# Patient Record
Sex: FEMALE | Race: WHITE | Employment: FULL TIME | URBAN - METROPOLITAN AREA
[De-identification: names, ages, dates, MRNs, and addresses within clinical notes are randomized per-mention and may not be internally consistent; named-entity substitution may affect disease eponyms.]

---

## 2017-01-21 ENCOUNTER — HOSPITAL ENCOUNTER (OUTPATIENT)
Age: 32
Discharge: HOME | End: 2017-01-21
Attending: FAMILY MEDICINE
Payer: COMMERCIAL

## 2017-01-21 VITALS
SYSTOLIC BLOOD PRESSURE: 116 MMHG | HEART RATE: 88 BPM | BODY MASS INDEX: 44.73 KG/M2 | OXYGEN SATURATION: 96 % | WEIGHT: 285 LBS | RESPIRATION RATE: 16 BRPM | TEMPERATURE: 99 F | DIASTOLIC BLOOD PRESSURE: 79 MMHG | HEIGHT: 67 IN

## 2017-01-21 DIAGNOSIS — J45.901 ASTHMA EXACERBATION, MILD: ICD-10-CM

## 2017-01-21 DIAGNOSIS — J20.9 ACUTE BRONCHITIS, UNSPECIFIED ORGANISM: Primary | ICD-10-CM

## 2017-01-21 PROCEDURE — 99213 OFFICE O/P EST LOW 20 MIN: CPT

## 2017-01-21 PROCEDURE — 99214 OFFICE O/P EST MOD 30 MIN: CPT

## 2017-01-21 RX ORDER — ALBUTEROL SULFATE 90 UG/1
2 AEROSOL, METERED RESPIRATORY (INHALATION) EVERY 4 HOURS PRN
Qty: 1 INHALER | Refills: 6 | Status: SHIPPED | OUTPATIENT
Start: 2017-01-21 | End: 2017-01-31

## 2017-01-21 RX ORDER — PREDNISONE 20 MG/1
40 TABLET ORAL DAILY
Qty: 10 TABLET | Refills: 0 | Status: SHIPPED | OUTPATIENT
Start: 2017-01-21 | End: 2017-01-26

## 2017-01-21 RX ORDER — CEFUROXIME AXETIL 500 MG/1
500 TABLET ORAL 2 TIMES DAILY
Qty: 14 TABLET | Refills: 0 | Status: SHIPPED | OUTPATIENT
Start: 2017-01-21 | End: 2017-01-28

## 2017-01-21 NOTE — ED PROVIDER NOTES
Patient presents with:  Cough/URI      HPI:   Pop Reynaga is a 36year old female who presents with complaints of chest congestion, productive cough with colored sputum for  3  weeks. Pt was seen here 3 weeks ago. Was running a fever then.  Was dx with lung   • Diabetes Paternal Grandmother    • Heart Attack Father         Smoking Status: Never Smoker                      Alcohol Use: No                  Nursing note reviewed and I agree with documentation.     The patient's Medication List, Past Medical Axetil (CEFTIN) 500 MG Oral Tab 14 tablet 0      Sig: Take 1 tablet (500 mg total) by mouth 2 (two) times daily.       Albuterol Sulfate HFA (PROAIR HFA) 108 (90 BASE) MCG/ACT Inhalation Aero Soln 1 Inhaler 6      Sig: Inhale 2 puffs into the lungs every 4 PATIENT STATED HISTORY:  Wet/dry cough, shortness of breath and fever for two days. FINDINGS:  Cardiac size and pulmonary vasculature are within normal limits. No pleural effusions. No pneumothorax. Hyperexpansion of the lungs.        12/31/2016  CONCLUS

## 2017-03-10 ENCOUNTER — HOSPITAL ENCOUNTER (OUTPATIENT)
Age: 32
Discharge: HOME | End: 2017-03-10
Attending: FAMILY MEDICINE
Payer: COMMERCIAL

## 2017-03-10 VITALS
DIASTOLIC BLOOD PRESSURE: 84 MMHG | HEART RATE: 76 BPM | WEIGHT: 290 LBS | RESPIRATION RATE: 16 BRPM | HEIGHT: 67 IN | OXYGEN SATURATION: 99 % | BODY MASS INDEX: 45.52 KG/M2 | TEMPERATURE: 98 F | SYSTOLIC BLOOD PRESSURE: 122 MMHG

## 2017-03-10 DIAGNOSIS — N89.8 VAGINAL DISCHARGE: Primary | ICD-10-CM

## 2017-03-10 LAB
POCT BILIRUBIN URINE: NEGATIVE
POCT GLUCOSE URINE: NEGATIVE MG/DL
POCT KETONE URINE: NEGATIVE MG/DL
POCT LEUKOCYTE ESTERASE URINE: NEGATIVE
POCT NITRITE URINE: NEGATIVE
POCT PH URINE: 7 (ref 5–8)
POCT PROTEIN URINE: NEGATIVE MG/DL
POCT SPECIFIC GRAVITY URINE: 1.01
POCT URINE CLARITY: CLEAR
POCT URINE COLOR: YELLOW
POCT UROBILINOGEN URINE: 0.2 MG/DL

## 2017-03-10 PROCEDURE — 99214 OFFICE O/P EST MOD 30 MIN: CPT

## 2017-03-10 PROCEDURE — 81002 URINALYSIS NONAUTO W/O SCOPE: CPT | Performed by: FAMILY MEDICINE

## 2017-03-10 NOTE — ED INITIAL ASSESSMENT (HPI)
Pt sts 2 weeks ago noted liquid stool passage via vagina when wiping. Evaluated by OB/GYNE with manual exam on 2/27 and CT on 3/6. CT with normal results.  Sts this has been happening intermittently since October 2016 along with passing of gas through vagin

## 2017-03-11 NOTE — ED PROVIDER NOTES
[place] [address] [facility] [place]   Patient presents with:  Bin-G (gynecologic)    Stated Complaint: stool issue    HPI  79-year-old female coming in with complains of a \"stool issue\", feels a sense of passing liquid stool vi Maternal Aunt      liver   • Cancer Maternal Grandmother      lung   • Diabetes Paternal Grandmother    • Heart Attack Father          Smoking Status: Never Smoker                      Alcohol Use: No                Review of Systems  Positive for stated c visualization of the posterior wall noted with no leakage noted.        ED Course     Labs Reviewed   POCT URINALYSIS DIPSTICK - Abnormal; Notable for the following:     Blood, Urine Trace-Intact (*)     All other components within normal limits       Order

## 2017-03-21 ENCOUNTER — OFFICE VISIT (OUTPATIENT)
Dept: UROLOGY | Facility: HOSPITAL | Age: 32
End: 2017-03-21
Attending: OBSTETRICS & GYNECOLOGY
Payer: COMMERCIAL

## 2017-03-21 VITALS
RESPIRATION RATE: 16 BRPM | WEIGHT: 200 LBS | DIASTOLIC BLOOD PRESSURE: 84 MMHG | SYSTOLIC BLOOD PRESSURE: 124 MMHG | BODY MASS INDEX: 31 KG/M2

## 2017-03-21 DIAGNOSIS — N82.3 RECTOVAGINAL FISTULA: Primary | ICD-10-CM

## 2017-03-21 DIAGNOSIS — N81.84 PELVIC MUSCLE WASTING: ICD-10-CM

## 2017-03-21 DIAGNOSIS — N39.3 FEMALE STRESS INCONTINENCE: ICD-10-CM

## 2017-03-21 PROCEDURE — 99201 HC OUTPT EVAL AND MGNT NEW PT LEVEL 1: CPT

## 2017-03-21 NOTE — PROGRESS NOTES
Maryann Burton,   3/21/2017     Referred by Dr. Mor Finney    Patient presents with:   Other:  pt c/o leaking donna thru the vagina, vag delivery     Uncomplicated pregnancy  -  2' tear (epidural pushed 2 hours) with uncomplicated recovery  s limited by UI/POP?: Yes    Review of Systems:    A comprehensive 12 point review of systems was completed. Pertinent positives noted in the the HPI.   No CP  No SOB  No s/sx of UTI    GENERAL EXAM:  GENERAL:  Alert and Oriented, and NAD  HEENT:  Normal, no risks related to RVFisula repair including infection, recurrence, pain, dyspareunia, fecal incontinence      Medications Discussed:  bowel mgmt with fiber, miralax, and immodium prn    Treatment Plan, Surgical: discussed  Rectal Vaginal/Vesico Vaginal fist

## 2017-03-21 NOTE — PATIENT INSTRUCTIONS
Please call with questions or concerns, [phone]    If interested in surgery you can call Susi to schedule, [address] [facility] MEDICINE     Post-Operative Guidelines      · AVOID CONSTIPATION - Take Miralax: one capful i increase the amount---fiber is not addictive and will not cause diarrhea.      _____Milk of Magnesia    Begin with 1 teaspoon every evening. This is a very low dose---approximately one-sixth of the typical dose.   You may need to increase the amount depend

## 2017-04-10 ENCOUNTER — TELEPHONE (OUTPATIENT)
Dept: UROLOGY | Facility: HOSPITAL | Age: 32
End: 2017-04-10

## 2017-04-10 NOTE — TELEPHONE ENCOUNTER
Pt called with questions regarding recto-vaginal fistula repair that Dr. Pablo Holcomb discussed with pt at last office visit 3-21-17. Pt wants to know more about the procedure and what it entails.   Will forward to Dr. Pablo Holcomb, [facility] for pt to notify her that we recd her mes

## 2017-04-21 NOTE — TELEPHONE ENCOUNTER
TC to pt re: RVF  Answered questions  She's interested in surgical management  Finishing up breastfeeding  Discussed plan  Surgery scheduled 6/1/17

## 2017-05-09 ENCOUNTER — APPOINTMENT (OUTPATIENT)
Dept: LAB | Age: 32
End: 2017-05-09
Attending: OBSTETRICS & GYNECOLOGY
Payer: COMMERCIAL

## 2017-05-09 ENCOUNTER — TELEPHONE (OUTPATIENT)
Dept: UROLOGY | Facility: HOSPITAL | Age: 32
End: 2017-05-09

## 2017-05-09 DIAGNOSIS — Z86.14 HISTORY OF MRSA INFECTION: Primary | ICD-10-CM

## 2017-05-09 DIAGNOSIS — Z01.818 PRE-OP TESTING: ICD-10-CM

## 2017-05-09 DIAGNOSIS — Z86.14 HISTORY OF MRSA INFECTION: ICD-10-CM

## 2017-05-09 PROCEDURE — 87081 CULTURE SCREEN ONLY: CPT

## 2017-05-09 NOTE — TELEPHONE ENCOUNTER
Pt phoned per Dr Seth Garcia request. LM that Surgery is scheduled for 6/2/17. We received a fax from our lab/infection control saying she has a hx of MRSA from 2003 in her right thigh. Pt instructed she will need to be cleared w/ 3 cultures.  Orders placed f

## 2017-05-10 ENCOUNTER — TELEPHONE (OUTPATIENT)
Dept: UROLOGY | Facility: HOSPITAL | Age: 32
End: 2017-05-10

## 2017-05-10 ENCOUNTER — LAB ENCOUNTER (OUTPATIENT)
Dept: LAB | Age: 32
End: 2017-05-10
Attending: OBSTETRICS & GYNECOLOGY
Payer: COMMERCIAL

## 2017-05-10 DIAGNOSIS — Z01.818 PRE-OP TESTING: ICD-10-CM

## 2017-05-10 DIAGNOSIS — Z86.14 HISTORY OF MRSA INFECTION: Primary | ICD-10-CM

## 2017-05-10 PROCEDURE — 87081 CULTURE SCREEN ONLY: CPT

## 2017-05-10 NOTE — TELEPHONE ENCOUNTER
Lab called [facility]  bc pt is present in their office for 2nd MRSA cx and no orders in system. Called lab back, explained that we are waiting on 1st cx results to determine if further testing is needed. Lab placed MRSA cx order for today and test was complete.

## 2017-05-12 ENCOUNTER — APPOINTMENT (OUTPATIENT)
Dept: LAB | Age: 32
End: 2017-05-12
Attending: OBSTETRICS & GYNECOLOGY
Payer: COMMERCIAL

## 2017-05-12 ENCOUNTER — TELEPHONE (OUTPATIENT)
Dept: UROLOGY | Facility: HOSPITAL | Age: 32
End: 2017-05-12

## 2017-05-12 DIAGNOSIS — Z86.14 HX MRSA INFECTION: Primary | ICD-10-CM

## 2017-05-12 DIAGNOSIS — Z86.14 HX MRSA INFECTION: ICD-10-CM

## 2017-05-12 PROCEDURE — 87081 CULTURE SCREEN ONLY: CPT

## 2017-05-12 NOTE — TELEPHONE ENCOUNTER
Phoned and LM informing pt of neg MRSA culture. Instructed pt to have 3rd culture done. Order placed.

## 2017-05-16 ENCOUNTER — TELEPHONE (OUTPATIENT)
Dept: UROLOGY | Facility: HOSPITAL | Age: 32
End: 2017-05-16

## 2017-05-16 NOTE — TELEPHONE ENCOUNTER
Called pt and notified of 3rd negative MRSA cx, Dr. Heladio Dow aware, surgery scheduled for 6-1-17, will continue as planned, pt verbalizes understanding

## 2017-05-19 ENCOUNTER — TELEPHONE (OUTPATIENT)
Dept: UROLOGY | Facility: HOSPITAL | Age: 32
End: 2017-05-19

## 2017-05-25 NOTE — OR NURSING
Patient removed from Isolation per Breezy Sifuentes in infection control after 3 negative swabs obtained 5/9/17, 5/10/17 and 5/12/17.

## 2017-06-01 ENCOUNTER — SURGERY (OUTPATIENT)
Age: 32
End: 2017-06-01

## 2017-06-01 ENCOUNTER — HOSPITAL ENCOUNTER (OUTPATIENT)
Facility: HOSPITAL | Age: 32
Setting detail: HOSPITAL OUTPATIENT SURGERY
Discharge: HOME | End: 2017-06-01
Attending: OBSTETRICS & GYNECOLOGY | Admitting: OBSTETRICS & GYNECOLOGY
Payer: COMMERCIAL

## 2017-06-01 ENCOUNTER — ANESTHESIA (OUTPATIENT)
Dept: SURGERY | Facility: HOSPITAL | Age: 32
End: 2017-06-01
Payer: COMMERCIAL

## 2017-06-01 ENCOUNTER — ANESTHESIA EVENT (OUTPATIENT)
Dept: SURGERY | Facility: HOSPITAL | Age: 32
End: 2017-06-01
Payer: COMMERCIAL

## 2017-06-01 VITALS
TEMPERATURE: 98 F | OXYGEN SATURATION: 100 % | DIASTOLIC BLOOD PRESSURE: 86 MMHG | RESPIRATION RATE: 20 BRPM | WEIGHT: 293 LBS | BODY MASS INDEX: 45.99 KG/M2 | SYSTOLIC BLOOD PRESSURE: 148 MMHG | HEART RATE: 80 BPM | HEIGHT: 67 IN

## 2017-06-01 PROCEDURE — 88304 TISSUE EXAM BY PATHOLOGIST: CPT | Performed by: OBSTETRICS & GYNECOLOGY

## 2017-06-01 PROCEDURE — 81025 URINE PREGNANCY TEST: CPT | Performed by: OBSTETRICS & GYNECOLOGY

## 2017-06-01 PROCEDURE — 0UQG0ZZ REPAIR VAGINA, OPEN APPROACH: ICD-10-PCS | Performed by: OBSTETRICS & GYNECOLOGY

## 2017-06-01 RX ORDER — BUPIVACAINE HYDROCHLORIDE AND EPINEPHRINE 2.5; 5 MG/ML; UG/ML
INJECTION, SOLUTION EPIDURAL; INFILTRATION; INTRACAUDAL; PERINEURAL AS NEEDED
Status: DISCONTINUED | OUTPATIENT
Start: 2017-06-01 | End: 2017-06-01

## 2017-06-01 RX ORDER — ONDANSETRON 2 MG/ML
4 INJECTION INTRAMUSCULAR; INTRAVENOUS AS NEEDED
Status: DISCONTINUED | OUTPATIENT
Start: 2017-06-01 | End: 2017-06-01

## 2017-06-01 RX ORDER — NALOXONE HYDROCHLORIDE 0.4 MG/ML
80 INJECTION, SOLUTION INTRAMUSCULAR; INTRAVENOUS; SUBCUTANEOUS AS NEEDED
Status: DISCONTINUED | OUTPATIENT
Start: 2017-06-01 | End: 2017-06-01

## 2017-06-01 RX ORDER — HYDROCODONE BITARTRATE AND ACETAMINOPHEN 5; 325 MG/1; MG/1
2 TABLET ORAL AS NEEDED
Status: COMPLETED | OUTPATIENT
Start: 2017-06-01 | End: 2017-06-01

## 2017-06-01 RX ORDER — MEPERIDINE HYDROCHLORIDE 25 MG/ML
12.5 INJECTION INTRAMUSCULAR; INTRAVENOUS; SUBCUTANEOUS AS NEEDED
Status: DISCONTINUED | OUTPATIENT
Start: 2017-06-01 | End: 2017-06-01

## 2017-06-01 RX ORDER — SODIUM CHLORIDE, SODIUM LACTATE, POTASSIUM CHLORIDE, CALCIUM CHLORIDE 600; 310; 30; 20 MG/100ML; MG/100ML; MG/100ML; MG/100ML
INJECTION, SOLUTION INTRAVENOUS CONTINUOUS
Status: DISCONTINUED | OUTPATIENT
Start: 2017-06-01 | End: 2017-06-01

## 2017-06-01 RX ORDER — HYDROMORPHONE HYDROCHLORIDE 1 MG/ML
INJECTION, SOLUTION INTRAMUSCULAR; INTRAVENOUS; SUBCUTANEOUS
Status: DISCONTINUED
Start: 2017-06-01 | End: 2017-06-01 | Stop reason: WASHOUT

## 2017-06-01 RX ORDER — METOCLOPRAMIDE HYDROCHLORIDE 5 MG/ML
INJECTION INTRAMUSCULAR; INTRAVENOUS
Status: COMPLETED
Start: 2017-06-01 | End: 2017-06-01

## 2017-06-01 RX ORDER — ONDANSETRON 2 MG/ML
INJECTION INTRAMUSCULAR; INTRAVENOUS
Status: COMPLETED
Start: 2017-06-01 | End: 2017-06-01

## 2017-06-01 RX ORDER — HYDROCODONE BITARTRATE AND ACETAMINOPHEN 5; 325 MG/1; MG/1
1 TABLET ORAL AS NEEDED
Status: COMPLETED | OUTPATIENT
Start: 2017-06-01 | End: 2017-06-01

## 2017-06-01 RX ORDER — METOCLOPRAMIDE HYDROCHLORIDE 5 MG/ML
10 INJECTION INTRAMUSCULAR; INTRAVENOUS AS NEEDED
Status: DISCONTINUED | OUTPATIENT
Start: 2017-06-01 | End: 2017-06-01

## 2017-06-01 RX ORDER — HYDROMORPHONE HYDROCHLORIDE 1 MG/ML
0.4 INJECTION, SOLUTION INTRAMUSCULAR; INTRAVENOUS; SUBCUTANEOUS EVERY 5 MIN PRN
Status: DISCONTINUED | OUTPATIENT
Start: 2017-06-01 | End: 2017-06-01

## 2017-06-01 NOTE — ANESTHESIA PREPROCEDURE EVALUATION
PRE-OP EVALUATION    Patient Name: Prosper Wen    Pre-op Diagnosis: RECTOVAGINAL FISTULA    Procedure(s):  RECTOVAGINAL FISTULA REPAIR, CYSTOSCOPY    Surgeon(s) and Role:     Marcelina Narvaez, DO - Primary     * Yue Del Valle MD    Pre-op vitals r discussed with surgeon and patient. Plan/risks discussed with: patient and spouse  Use of blood product(s) discussed with: patient and spouse    Consented to blood products.           Present on Admission:   **None**

## 2017-06-01 NOTE — ANESTHESIA POSTPROCEDURE EVALUATION
[facility]    Alondra Venegas Patient Status:  Hospital Outpatient Surgery   Age/Gender 36year old female MRN [id number]   Location [address] [facility] Attending Oren Howell DO   Hosp Day # 0 PCP Annmarie

## 2017-06-01 NOTE — H&P
35 y/o female with rectovaginal fistula for surgical management. She reports gas & stool per rectum since 4/16 vaginal delivery.    Past Medical History   Diagnosis Date   • Asthma    • Abnormal Pap smear of cervix      during college, 2010? follow up nl

## 2017-06-01 NOTE — OPERATIVE REPORT
Pre Op: rectovaginal fistula  Post op: same    Procedure: Rectovaginal fistula repair  Specimen: fistula tract  Surgeon: Ki Penn DO  Assist: KATHERINE Penny    EBL: 25cc   UOP [id number]   No complications    Findings: 2cm rectal defect.  Hemostasis upon completion fistula. Urine was drained from the bladder. The patient was then removed from the dorsal lithotomy position, awakened and extubated and transferred from the operating room to the recovery room in stable condition, having tolerated the procedure well.

## 2017-06-02 ENCOUNTER — TELEPHONE (OUTPATIENT)
Dept: UROLOGY | Facility: HOSPITAL | Age: 32
End: 2017-06-02

## 2017-06-02 RX ORDER — POLYETHYLENE GLYCOL 3350 17 G/17G
17 POWDER, FOR SOLUTION ORAL DAILY
COMMUNITY
End: 2018-12-03 | Stop reason: ALTCHOICE

## 2017-06-02 RX ORDER — IBUPROFEN 600 MG/1
600 TABLET ORAL EVERY 6 HOURS PRN
COMMUNITY
End: 2018-12-03 | Stop reason: ALTCHOICE

## 2017-06-02 NOTE — TELEPHONE ENCOUNTER
Returning [place] call - voiding well, had small BM prior to discharge yesterday, is not using Norco, states is made her nauseated - is not having any pain; has not started the Ibuprofen - Only concern is that she has a pressure sensation in rectal ar

## 2017-06-08 ENCOUNTER — TELEPHONE (OUTPATIENT)
Dept: UROLOGY | Facility: HOSPITAL | Age: 32
End: 2017-06-08

## 2017-06-20 ENCOUNTER — OFFICE VISIT (OUTPATIENT)
Dept: UROLOGY | Facility: HOSPITAL | Age: 32
End: 2017-06-20
Attending: OBSTETRICS & GYNECOLOGY
Payer: COMMERCIAL

## 2017-06-20 VITALS
BODY MASS INDEX: 45.99 KG/M2 | WEIGHT: 293 LBS | DIASTOLIC BLOOD PRESSURE: 84 MMHG | SYSTOLIC BLOOD PRESSURE: 120 MMHG | HEIGHT: 67 IN

## 2017-06-20 DIAGNOSIS — Z98.890 POST-OPERATIVE STATE: Primary | ICD-10-CM

## 2017-06-20 PROCEDURE — 99211 OFF/OP EST MAY X REQ PHY/QHP: CPT

## 2017-06-20 NOTE — PROGRESS NOTES
She is s/p Post-Op Summary  Procedure Date: 06/01/17  Procedure Name:  (rectovaginal fistula repair)  Do you feel your surgery was successful?: Very successful  Compared to before surgery are you?: Much better  If you could go back to before your surgery,

## 2017-07-18 ENCOUNTER — OFFICE VISIT (OUTPATIENT)
Dept: UROLOGY | Facility: HOSPITAL | Age: 32
End: 2017-07-18
Attending: OBSTETRICS & GYNECOLOGY
Payer: COMMERCIAL

## 2017-07-18 VITALS
WEIGHT: 293 LBS | HEIGHT: 67 IN | BODY MASS INDEX: 45.99 KG/M2 | SYSTOLIC BLOOD PRESSURE: 136 MMHG | DIASTOLIC BLOOD PRESSURE: 82 MMHG

## 2017-07-18 DIAGNOSIS — Z98.890 POST-OPERATIVE STATE: Primary | ICD-10-CM

## 2017-07-18 DIAGNOSIS — N81.84 PELVIC MUSCLE WASTING: ICD-10-CM

## 2017-07-18 PROCEDURE — 99211 OFF/OP EST MAY X REQ PHY/QHP: CPT

## 2017-07-18 NOTE — PROGRESS NOTES
She is s/p Post-Op Summary  Procedure Date: 06/01/17  Procedure Name: Other (Please specify in comments) (rectovaginal fistula)  Post-Op Symptoms: Patient denies pain, CASSANDRA, UUI, prolapse symptoms, nausea/vomitting, fevers/chills, bleeding, voiding dysfunct

## 2017-09-19 ENCOUNTER — OFFICE VISIT (OUTPATIENT)
Dept: UROLOGY | Facility: HOSPITAL | Age: 32
End: 2017-09-19
Attending: OBSTETRICS & GYNECOLOGY
Payer: COMMERCIAL

## 2017-09-19 VITALS
WEIGHT: 293 LBS | DIASTOLIC BLOOD PRESSURE: 72 MMHG | HEIGHT: 67 IN | BODY MASS INDEX: 45.99 KG/M2 | SYSTOLIC BLOOD PRESSURE: 126 MMHG

## 2017-09-19 DIAGNOSIS — Z98.890 POST-OPERATIVE STATE: Primary | ICD-10-CM

## 2017-09-19 DIAGNOSIS — N81.84 PELVIC MUSCLE WASTING: ICD-10-CM

## 2017-09-19 PROCEDURE — 99211 OFF/OP EST MAY X REQ PHY/QHP: CPT

## 2017-09-19 NOTE — PROGRESS NOTES
She is s/p Post-Op Summary  Procedure Date: 06/01/17  Procedure Name:  (fistula repair)  Post-Op Symptoms: Other (please specify); Patient denies pain, CASSANDRA, UUI, prolapse symptoms, nausea/vomitting, fevers/chills, bleeding, voiding dysfunction, and defecato

## 2018-01-05 ENCOUNTER — HOSPITAL ENCOUNTER (OUTPATIENT)
Age: 33
Discharge: HOME | End: 2018-01-05
Attending: EMERGENCY MEDICINE
Payer: COMMERCIAL

## 2018-01-05 VITALS
BODY MASS INDEX: 45.52 KG/M2 | TEMPERATURE: 97 F | HEIGHT: 67 IN | DIASTOLIC BLOOD PRESSURE: 80 MMHG | HEART RATE: 90 BPM | RESPIRATION RATE: 16 BRPM | OXYGEN SATURATION: 99 % | WEIGHT: 290 LBS | SYSTOLIC BLOOD PRESSURE: 104 MMHG

## 2018-01-05 DIAGNOSIS — J02.0 STREPTOCOCCAL SORE THROAT: Primary | ICD-10-CM

## 2018-01-05 PROCEDURE — 87430 STREP A AG IA: CPT | Performed by: EMERGENCY MEDICINE

## 2018-01-05 PROCEDURE — 99213 OFFICE O/P EST LOW 20 MIN: CPT

## 2018-01-05 PROCEDURE — 99214 OFFICE O/P EST MOD 30 MIN: CPT

## 2018-01-05 RX ORDER — AMOXICILLIN 875 MG/1
875 TABLET, COATED ORAL 2 TIMES DAILY
Qty: 20 TABLET | Refills: 0 | Status: SHIPPED | OUTPATIENT
Start: 2018-01-05 | End: 2018-01-15

## 2018-01-05 NOTE — ED PROVIDER NOTES
Patient Seen in: [address] [facility]    History   Patient presents with:  Sore Throat    Stated Complaint: sore throat    HPI    Patient is a very pleasant 72-year-old female presents with a sore throat. Pain on swallowing. Mild congestion. No murmurs. Regular rate and rhythm. Abdomen: Soft and nontender throughout. No rebound or guarding  Extremities: No clubbing/cyanosis/edema. Skin: No rashes, no pallor  Neuro: Awake oriented ×3.   Nonfocal.  Good strength throughout    ED Course   Labs

## 2018-01-05 NOTE — ED INITIAL ASSESSMENT (HPI)
Pt with c/o sore throat that started last night around 6pm.  Pt denies fevers. Pt c/o cold sx.   Last motrin around 11pm.   home with flu last week

## 2018-02-26 ENCOUNTER — HOSPITAL ENCOUNTER (OUTPATIENT)
Age: 33
Discharge: HOME | End: 2018-02-26
Attending: FAMILY MEDICINE
Payer: COMMERCIAL

## 2018-02-26 VITALS
SYSTOLIC BLOOD PRESSURE: 120 MMHG | HEART RATE: 82 BPM | OXYGEN SATURATION: 99 % | TEMPERATURE: 98 F | RESPIRATION RATE: 18 BRPM | DIASTOLIC BLOOD PRESSURE: 90 MMHG

## 2018-02-26 DIAGNOSIS — H65.02 ACUTE SEROUS OTITIS MEDIA OF LEFT EAR, RECURRENCE NOT SPECIFIED: ICD-10-CM

## 2018-02-26 DIAGNOSIS — J01.00 ACUTE MAXILLARY SINUSITIS, RECURRENCE NOT SPECIFIED: Primary | ICD-10-CM

## 2018-02-26 PROBLEM — N82.3 RECTO-VAGINAL FISTULA: Status: ACTIVE | Noted: 2018-02-26

## 2018-02-26 PROCEDURE — 99214 OFFICE O/P EST MOD 30 MIN: CPT

## 2018-02-26 PROCEDURE — 87624 HPV HI-RISK TYP POOLED RSLT: CPT | Performed by: OBSTETRICS & GYNECOLOGY

## 2018-02-26 PROCEDURE — 88175 CYTOPATH C/V AUTO FLUID REDO: CPT | Performed by: OBSTETRICS & GYNECOLOGY

## 2018-02-26 PROCEDURE — 99213 OFFICE O/P EST LOW 20 MIN: CPT

## 2018-02-26 RX ORDER — PREDNISONE 20 MG/1
40 TABLET ORAL DAILY
Qty: 10 TABLET | Refills: 0 | Status: SHIPPED | OUTPATIENT
Start: 2018-02-26 | End: 2018-03-03

## 2018-02-26 RX ORDER — AMOXICILLIN AND CLAVULANATE POTASSIUM 875; 125 MG/1; MG/1
1 TABLET, FILM COATED ORAL 2 TIMES DAILY
Qty: 20 TABLET | Refills: 0 | Status: SHIPPED | OUTPATIENT
Start: 2018-02-26 | End: 2018-03-08

## 2018-02-26 NOTE — ED PROVIDER NOTES
Patient Seen in: [address] [facility]    History   Patient presents with:  Sinusitis  Ear Pain    Stated Complaint: sinus congestion    HPI  61-year-old female presents to the immediate care today with 10 day history of sinus pressure, con external ear canal right ear and abnormal TM left ear - dull, bulging and serous middle ear fluid  Nose: moderate congestion, moderate maxillary sinus tenderness bilateral, moderate frontal sinus tenderness bilateral. No nasal flaring  Throat: lips, mucosa

## 2018-12-03 PROCEDURE — 86850 RBC ANTIBODY SCREEN: CPT | Performed by: OBSTETRICS & GYNECOLOGY

## 2018-12-03 PROCEDURE — 87389 HIV-1 AG W/HIV-1&-2 AB AG IA: CPT | Performed by: OBSTETRICS & GYNECOLOGY

## 2018-12-03 PROCEDURE — 86762 RUBELLA ANTIBODY: CPT | Performed by: OBSTETRICS & GYNECOLOGY

## 2018-12-03 PROCEDURE — 86780 TREPONEMA PALLIDUM: CPT | Performed by: OBSTETRICS & GYNECOLOGY

## 2018-12-03 PROCEDURE — 86901 BLOOD TYPING SEROLOGIC RH(D): CPT | Performed by: OBSTETRICS & GYNECOLOGY

## 2018-12-03 PROCEDURE — 86900 BLOOD TYPING SEROLOGIC ABO: CPT | Performed by: OBSTETRICS & GYNECOLOGY

## 2019-01-02 PROCEDURE — 87086 URINE CULTURE/COLONY COUNT: CPT | Performed by: OBSTETRICS & GYNECOLOGY

## 2019-01-23 PROBLEM — Z86.14 HISTORY OF MRSA INFECTION: Status: ACTIVE | Noted: 2019-01-23

## 2019-01-23 PROBLEM — Z67.91 RH NEGATIVE STATE IN ANTEPARTUM PERIOD: Status: ACTIVE | Noted: 2019-01-23

## 2019-01-23 PROBLEM — Z67.91 RH NEGATIVE STATE IN ANTEPARTUM PERIOD (HCC): Status: ACTIVE | Noted: 2019-01-23

## 2019-01-23 PROBLEM — O26.899 RH NEGATIVE STATE IN ANTEPARTUM PERIOD: Status: ACTIVE | Noted: 2019-01-23

## 2019-01-23 PROBLEM — O99.210 OBESITY AFFECTING PREGNANCY, ANTEPARTUM (HCC): Status: ACTIVE | Noted: 2019-01-23

## 2019-01-23 PROBLEM — Z87.59 HISTORY OF DELIVERY OF MACROSOMAL INFANT: Status: ACTIVE | Noted: 2019-01-23

## 2019-01-23 PROBLEM — O99.210 OBESITY AFFECTING PREGNANCY, ANTEPARTUM: Status: ACTIVE | Noted: 2019-01-23

## 2019-01-23 PROBLEM — O26.899 RH NEGATIVE STATE IN ANTEPARTUM PERIOD (HCC): Status: ACTIVE | Noted: 2019-01-23

## 2019-02-28 PROBLEM — O44.00 PLACENTA PREVIA WITHOUT HEMORRHAGE, ANTEPARTUM: Status: ACTIVE | Noted: 2019-02-28

## 2019-02-28 PROBLEM — O44.00 PLACENTA PREVIA WITHOUT HEMORRHAGE, ANTEPARTUM (HCC): Status: ACTIVE | Noted: 2019-02-28

## 2019-02-28 PROCEDURE — 87086 URINE CULTURE/COLONY COUNT: CPT | Performed by: OBSTETRICS & GYNECOLOGY

## 2019-03-26 ENCOUNTER — OFFICE VISIT (OUTPATIENT)
Dept: PERINATAL CARE | Facility: HOSPITAL | Age: 34
End: 2019-03-26
Attending: OBSTETRICS & GYNECOLOGY
Payer: COMMERCIAL

## 2019-03-26 VITALS
HEIGHT: 67 IN | DIASTOLIC BLOOD PRESSURE: 79 MMHG | WEIGHT: 269 LBS | SYSTOLIC BLOOD PRESSURE: 116 MMHG | BODY MASS INDEX: 42.22 KG/M2 | HEART RATE: 96 BPM

## 2019-03-26 DIAGNOSIS — O99.210 OBESITY AFFECTING PREGNANCY, ANTEPARTUM: ICD-10-CM

## 2019-03-26 DIAGNOSIS — O44.00 PLACENTA PREVIA WITHOUT HEMORRHAGE, ANTEPARTUM: ICD-10-CM

## 2019-03-26 DIAGNOSIS — O35.8XX0 PYELECTASIS OF FETUS ON PRENATAL ULTRASOUND: ICD-10-CM

## 2019-03-26 DIAGNOSIS — Z87.59 HISTORY OF DELIVERY OF MACROSOMAL INFANT: ICD-10-CM

## 2019-03-26 DIAGNOSIS — N82.3 RECTO-VAGINAL FISTULA: ICD-10-CM

## 2019-03-26 DIAGNOSIS — E66.01 MORBID OBESITY WITH BMI OF 40.0-44.9, ADULT (HCC): ICD-10-CM

## 2019-03-26 PROBLEM — O35.EXX0 PYELECTASIS OF FETUS ON PRENATAL ULTRASOUND: Status: ACTIVE | Noted: 2019-03-26

## 2019-03-26 PROBLEM — O35.EXX0 PYELECTASIS OF FETUS ON PRENATAL ULTRASOUND (HCC): Status: ACTIVE | Noted: 2019-03-26

## 2019-03-26 PROCEDURE — 99244 OFF/OP CNSLTJ NEW/EST MOD 40: CPT | Performed by: OBSTETRICS & GYNECOLOGY

## 2019-03-26 PROCEDURE — 76817 TRANSVAGINAL US OBSTETRIC: CPT | Performed by: OBSTETRICS & GYNECOLOGY

## 2019-03-26 PROCEDURE — 76811 OB US DETAILED SNGL FETUS: CPT | Performed by: OBSTETRICS & GYNECOLOGY

## 2019-03-26 NOTE — PROGRESS NOTES
Indication: placenta previa.   ____________________________________________________________________________  History: Age: 34 years.  : 2 Para: 1.  ____________________________________________________________________________  Dating:  LMP: 10/09/20 mild.    ____________________________________________________________________________  Maternal Structures:  Cervix: normal. Cervical length: 5 mm.  ____________________________________________________________________________  Comments:    Jacque Jasso, women, increasing obesity is associated with decreasing spontaneous pregnancy rates. The increased risk of miscarriage in obese women may be because such women often have PCOS or isolated insulin resistance.                  Due to its strong association w infection, thromboembolism, and endometritis. Maternal obesity appears to be associated with a small increase in the absolute rate of some congenital anomalies, and the risk may increase with increasing maternal weight.   The risk of neural tube pyelectasis detected in the mid-trimester may progress; therefore, a follow up ultrasound evaluation in the early third trimester is advised. IMPRESSION:    1.  IUP at  24 0/7 wks    2. Scan consistent with dates     3.   No ultrasound evidence of st

## 2019-04-19 PROCEDURE — 86850 RBC ANTIBODY SCREEN: CPT | Performed by: OBSTETRICS & GYNECOLOGY

## 2019-04-19 PROCEDURE — 87389 HIV-1 AG W/HIV-1&-2 AB AG IA: CPT | Performed by: OBSTETRICS & GYNECOLOGY

## 2019-04-23 NOTE — PROGRESS NOTES
John Medina  Dear Dr. Gerardo Leija,     Thank you for requesting ultrasound evaluation and maternal fetal medicine consultation on your patient Ranjan Herrera.   As you are aware she is a 34year old female  with a Sin Ratio 1.075  47th%  FL/AC Ratio 0.222  n/a  BPD/FL Ratio 1.162  <5th%  HC/FL Ratio 4.795  26th%  EFW (lbs/oz) 3 lbs 6 ozs  EFW (g) 1546 g  55th%     Fetal heart activity: present. Fetal heart rate: 146 bpm.   Fetal presentation: cephalic.    Amniotic fluid: antepartum bleeding decrease as the distance between the placental edge and internal os increases.  There is a general consensus that there is a reasonable possibility of vaginal delivery without hemorrhage when the placenta is more than 20 mm from the inte placental edge is greater than 1 cm from the internal loss, she would be a good candidate for a vaginal delivery.     Thank you for allowing me to participate in the care of your patient.   Please do not hesitate to contact me if additional questions or con

## 2019-05-07 ENCOUNTER — OFFICE VISIT (OUTPATIENT)
Dept: PERINATAL CARE | Facility: HOSPITAL | Age: 34
End: 2019-05-07
Attending: OBSTETRICS & GYNECOLOGY
Payer: COMMERCIAL

## 2019-05-07 VITALS
SYSTOLIC BLOOD PRESSURE: 106 MMHG | DIASTOLIC BLOOD PRESSURE: 76 MMHG | WEIGHT: 274 LBS | BODY MASS INDEX: 43 KG/M2 | HEART RATE: 99 BPM | HEIGHT: 67 IN

## 2019-05-07 DIAGNOSIS — O35.8XX0 PYELECTASIS OF FETUS ON PRENATAL ULTRASOUND: ICD-10-CM

## 2019-05-07 DIAGNOSIS — E66.01 MORBID OBESITY WITH BMI OF 40.0-44.9, ADULT (HCC): ICD-10-CM

## 2019-05-07 DIAGNOSIS — O99.210 OBESITY AFFECTING PREGNANCY, ANTEPARTUM: ICD-10-CM

## 2019-05-07 DIAGNOSIS — N82.3 RECTO-VAGINAL FISTULA: ICD-10-CM

## 2019-05-07 DIAGNOSIS — Z87.59 HISTORY OF DELIVERY OF MACROSOMAL INFANT: ICD-10-CM

## 2019-05-07 DIAGNOSIS — O44.00 PLACENTA PREVIA WITHOUT HEMORRHAGE, ANTEPARTUM: ICD-10-CM

## 2019-05-07 PROCEDURE — 76817 TRANSVAGINAL US OBSTETRIC: CPT | Performed by: OBSTETRICS & GYNECOLOGY

## 2019-05-07 PROCEDURE — 99215 OFFICE O/P EST HI 40 MIN: CPT | Performed by: OBSTETRICS & GYNECOLOGY

## 2019-05-07 PROCEDURE — 76816 OB US FOLLOW-UP PER FETUS: CPT | Performed by: OBSTETRICS & GYNECOLOGY

## 2019-06-13 NOTE — PROGRESS NOTES
Moustapha Venegas  Dear Dr. Gerardo Leija,     Thank you for requesting ultrasound evaluation and maternal fetal medicine consultation on your patient Paramjit Pacheco you are aware she is a 34year old female  with a Sin ozs  EFW (g) 2950 g  66th%     Fetal heart activity: present. Fetal heart rate: 133 bpm.   Fetal presentation: cephalic. Cord: normal.     Fetal Anatomy:  Visualized with normal appearance: spine, bladder.     Brain: Visualized and normal appearance: Cist internal os. PYELECTASIS  The AP diameter of the right renal pelvis measures 6 mm (normal) AP diameter of the left renal pelvis is enlarged measuring 9 mm. As result  pediatric urology follow-up is now advised.     IMPRESSION:  · IUP at 35w3d

## 2019-06-14 ENCOUNTER — TELEPHONE (OUTPATIENT)
Dept: UROLOGY | Facility: HOSPITAL | Age: 34
End: 2019-06-14

## 2019-06-14 ENCOUNTER — OFFICE VISIT (OUTPATIENT)
Dept: PERINATAL CARE | Facility: HOSPITAL | Age: 34
End: 2019-06-14
Attending: OBSTETRICS & GYNECOLOGY
Payer: COMMERCIAL

## 2019-06-14 VITALS
HEART RATE: 111 BPM | DIASTOLIC BLOOD PRESSURE: 84 MMHG | SYSTOLIC BLOOD PRESSURE: 130 MMHG | BODY MASS INDEX: 43.79 KG/M2 | RESPIRATION RATE: 20 BRPM | WEIGHT: 279 LBS | HEIGHT: 67 IN

## 2019-06-14 DIAGNOSIS — E66.01 MORBID OBESITY WITH BMI OF 40.0-44.9, ADULT (HCC): ICD-10-CM

## 2019-06-14 DIAGNOSIS — Z03.72 PLACENTAL PROBLEM SUSPECTED BUT NOT FOUND: ICD-10-CM

## 2019-06-14 DIAGNOSIS — O99.210 OBESITY AFFECTING PREGNANCY, ANTEPARTUM: ICD-10-CM

## 2019-06-14 DIAGNOSIS — O44.00 PLACENTA PREVIA WITHOUT HEMORRHAGE, ANTEPARTUM: ICD-10-CM

## 2019-06-14 PROCEDURE — 76819 FETAL BIOPHYS PROFIL W/O NST: CPT | Performed by: OBSTETRICS & GYNECOLOGY

## 2019-06-14 PROCEDURE — 76816 OB US FOLLOW-UP PER FETUS: CPT | Performed by: OBSTETRICS & GYNECOLOGY

## 2019-06-14 PROCEDURE — 76817 TRANSVAGINAL US OBSTETRIC: CPT | Performed by: OBSTETRICS & GYNECOLOGY

## 2019-06-14 PROCEDURE — 99213 OFFICE O/P EST LOW 20 MIN: CPT | Performed by: OBSTETRICS & GYNECOLOGY

## 2019-06-14 NOTE — PROGRESS NOTES
Pt seen in MFM at 35 3/7 weeks gestation, ambulatory and accompanied by . + FM. Denies concerns or discomforts.

## 2019-06-14 NOTE — TELEPHONE ENCOUNTER
TC to pt regarding future delivery route  LM    Spoke w/ pt, discussed risks, benefits, alternatives, and goals of different routes of delivery given h/o RVF  She's concerned about risks related to c/s, worried about vag delivery post RVF repair  Answered

## 2019-07-07 ENCOUNTER — HOSPITAL ENCOUNTER (OUTPATIENT)
Facility: HOSPITAL | Age: 34
Setting detail: OBSERVATION
Discharge: HOME | End: 2019-07-07
Attending: OBSTETRICS & GYNECOLOGY | Admitting: OBSTETRICS & GYNECOLOGY
Payer: COMMERCIAL

## 2019-07-07 ENCOUNTER — HOSPITAL ENCOUNTER (INPATIENT)
Facility: HOSPITAL | Age: 34
LOS: 3 days | Discharge: HOME | End: 2019-07-11
Attending: EMERGENCY MEDICINE | Admitting: OBSTETRICS & GYNECOLOGY
Payer: COMMERCIAL

## 2019-07-07 ENCOUNTER — APPOINTMENT (OUTPATIENT)
Dept: MRI IMAGING | Facility: HOSPITAL | Age: 34
End: 2019-07-07
Attending: EMERGENCY MEDICINE
Payer: COMMERCIAL

## 2019-07-07 VITALS
RESPIRATION RATE: 18 BRPM | TEMPERATURE: 98 F | HEIGHT: 67.01 IN | SYSTOLIC BLOOD PRESSURE: 132 MMHG | DIASTOLIC BLOOD PRESSURE: 74 MMHG | WEIGHT: 285.5 LBS | HEART RATE: 71 BPM | BODY MASS INDEX: 44.81 KG/M2

## 2019-07-07 DIAGNOSIS — R77.8 ELEVATED TROPONIN: Primary | ICD-10-CM

## 2019-07-07 PROBLEM — R79.89 ELEVATED TROPONIN: Status: ACTIVE | Noted: 2019-07-07

## 2019-07-07 PROBLEM — Z34.90 PREGNANCY: Status: ACTIVE | Noted: 2019-07-07

## 2019-07-07 PROBLEM — Z34.90 PREGNANCY (HCC): Status: ACTIVE | Noted: 2019-07-07

## 2019-07-07 LAB
ALBUMIN SERPL-MCNC: 2.6 G/DL (ref 3.4–5)
ALBUMIN SERPL-MCNC: 2.7 G/DL (ref 3.4–5)
ALBUMIN/GLOB SERPL: 0.6 {RATIO} (ref 1–2)
ALBUMIN/GLOB SERPL: 0.6 {RATIO} (ref 1–2)
ALP LIVER SERPL-CCNC: 144 U/L (ref 37–98)
ALP LIVER SERPL-CCNC: 151 U/L (ref 37–98)
ALT SERPL-CCNC: 12 U/L (ref 13–56)
ALT SERPL-CCNC: 14 U/L (ref 13–56)
ANION GAP SERPL CALC-SCNC: 7 MMOL/L (ref 0–18)
ANION GAP SERPL CALC-SCNC: 9 MMOL/L (ref 0–18)
APTT PPP: 32.5 SECONDS (ref 25.4–36.1)
AST SERPL-CCNC: 14 U/L (ref 15–37)
AST SERPL-CCNC: 14 U/L (ref 15–37)
BASOPHILS # BLD AUTO: 0.03 X10(3) UL (ref 0–0.2)
BASOPHILS # BLD AUTO: 0.05 X10(3) UL (ref 0–0.2)
BASOPHILS NFR BLD AUTO: 0.2 %
BASOPHILS NFR BLD AUTO: 0.3 %
BILIRUB SERPL-MCNC: 0.4 MG/DL (ref 0.1–2)
BILIRUB SERPL-MCNC: 0.4 MG/DL (ref 0.1–2)
BILIRUBIN URINE: NEGATIVE
BUN BLD-MCNC: 9 MG/DL (ref 7–18)
BUN BLD-MCNC: 9 MG/DL (ref 7–18)
BUN/CREAT SERPL: 14.3 (ref 10–20)
BUN/CREAT SERPL: 15.3 (ref 10–20)
CALCIUM BLD-MCNC: 9.4 MG/DL (ref 8.5–10.1)
CALCIUM BLD-MCNC: 9.6 MG/DL (ref 8.5–10.1)
CHLORIDE SERPL-SCNC: 106 MMOL/L (ref 98–112)
CHLORIDE SERPL-SCNC: 107 MMOL/L (ref 98–112)
CO2 SERPL-SCNC: 20 MMOL/L (ref 21–32)
CO2 SERPL-SCNC: 23 MMOL/L (ref 21–32)
CONTROL RUN WITHIN 24 HOURS?: YES
CREAT BLD-MCNC: 0.59 MG/DL (ref 0.55–1.02)
CREAT BLD-MCNC: 0.63 MG/DL (ref 0.55–1.02)
DEPRECATED RDW RBC AUTO: 43 FL (ref 35.1–46.3)
DEPRECATED RDW RBC AUTO: 43.2 FL (ref 35.1–46.3)
EOSINOPHIL # BLD AUTO: 0.02 X10(3) UL (ref 0–0.7)
EOSINOPHIL # BLD AUTO: 0.03 X10(3) UL (ref 0–0.7)
EOSINOPHIL NFR BLD AUTO: 0.1 %
EOSINOPHIL NFR BLD AUTO: 0.2 %
ERYTHROCYTE [DISTWIDTH] IN BLOOD BY AUTOMATED COUNT: 12.9 % (ref 11–15)
ERYTHROCYTE [DISTWIDTH] IN BLOOD BY AUTOMATED COUNT: 12.9 % (ref 11–15)
GLOBULIN PLAS-MCNC: 4.3 G/DL (ref 2.8–4.4)
GLOBULIN PLAS-MCNC: 4.4 G/DL (ref 2.8–4.4)
GLUCOSE BLD-MCNC: 80 MG/DL (ref 70–99)
GLUCOSE BLD-MCNC: 80 MG/DL (ref 70–99)
GLUCOSE BLD-MCNC: 82 MG/DL (ref 70–99)
GLUCOSE URINE: NEGATIVE
HCT VFR BLD AUTO: 36.8 % (ref 35–48)
HCT VFR BLD AUTO: 39.1 % (ref 35–48)
HGB BLD-MCNC: 12.8 G/DL (ref 12–16)
HGB BLD-MCNC: 13.6 G/DL (ref 12–16)
IMM GRANULOCYTES # BLD AUTO: 0.11 X10(3) UL (ref 0–1)
IMM GRANULOCYTES # BLD AUTO: 0.12 X10(3) UL (ref 0–1)
IMM GRANULOCYTES NFR BLD: 0.8 %
IMM GRANULOCYTES NFR BLD: 0.8 %
INR BLD: 0.93 (ref 0.9–1.1)
KETONE URINE: NEGATIVE
LYMPHOCYTES # BLD AUTO: 2.38 X10(3) UL (ref 1–4)
LYMPHOCYTES # BLD AUTO: 2.54 X10(3) UL (ref 1–4)
LYMPHOCYTES NFR BLD AUTO: 17 %
LYMPHOCYTES NFR BLD AUTO: 17.2 %
M PROTEIN MFR SERPL ELPH: 7 G/DL (ref 6.4–8.2)
M PROTEIN MFR SERPL ELPH: 7 G/DL (ref 6.4–8.2)
MCH RBC QN AUTO: 31.8 PG (ref 26–34)
MCH RBC QN AUTO: 32 PG (ref 26–34)
MCHC RBC AUTO-ENTMCNC: 34.8 G/DL (ref 31–37)
MCHC RBC AUTO-ENTMCNC: 34.8 G/DL (ref 31–37)
MCV RBC AUTO: 91.5 FL (ref 80–100)
MCV RBC AUTO: 92 FL (ref 80–100)
MONOCYTES # BLD AUTO: 0.77 X10(3) UL (ref 0.1–1)
MONOCYTES # BLD AUTO: 0.82 X10(3) UL (ref 0.1–1)
MONOCYTES NFR BLD AUTO: 5.5 %
MONOCYTES NFR BLD AUTO: 5.6 %
NEUTROPHILS # BLD AUTO: 10.53 X10 (3) UL (ref 1.5–7.7)
NEUTROPHILS # BLD AUTO: 10.53 X10(3) UL (ref 1.5–7.7)
NEUTROPHILS # BLD AUTO: 11.37 X10 (3) UL (ref 1.5–7.7)
NEUTROPHILS # BLD AUTO: 11.37 X10(3) UL (ref 1.5–7.7)
NEUTROPHILS NFR BLD AUTO: 76.1 %
NEUTROPHILS NFR BLD AUTO: 76.2 %
NITRITE URINE: NEGATIVE
OSMOLALITY SERPL CALC.SUM OF ELEC: 280 MOSM/KG (ref 275–295)
OSMOLALITY SERPL CALC.SUM OF ELEC: 280 MOSM/KG (ref 275–295)
PH URINE: 6.5 (ref 5–8)
PLATELET # BLD AUTO: 203 10(3)UL (ref 150–450)
PLATELET # BLD AUTO: 214 10(3)UL (ref 150–450)
POTASSIUM SERPL-SCNC: 3.7 MMOL/L (ref 3.5–5.1)
POTASSIUM SERPL-SCNC: 3.9 MMOL/L (ref 3.5–5.1)
PSA SERPL DL<=0.01 NG/ML-MCNC: 12.8 SECONDS (ref 12.5–14.7)
RBC # BLD AUTO: 4.02 X10(6)UL (ref 3.8–5.3)
RBC # BLD AUTO: 4.25 X10(6)UL (ref 3.8–5.3)
SODIUM SERPL-SCNC: 136 MMOL/L (ref 136–145)
SODIUM SERPL-SCNC: 136 MMOL/L (ref 136–145)
SPEC GRAVITY: 1.02 (ref 1–1.03)
TROPONIN I SERPL-MCNC: 0.33 NG/ML (ref ?–0.04)
URATE SERPL-MCNC: 4.4 MG/DL (ref 2.6–6)
UROBILINOGEN URINE: 0.2
WBC # BLD AUTO: 13.8 X10(3) UL (ref 4–11)
WBC # BLD AUTO: 14.9 X10(3) UL (ref 4–11)

## 2019-07-07 PROCEDURE — 70544 MR ANGIOGRAPHY HEAD W/O DYE: CPT | Performed by: EMERGENCY MEDICINE

## 2019-07-07 PROCEDURE — 99223 1ST HOSP IP/OBS HIGH 75: CPT | Performed by: HOSPITALIST

## 2019-07-08 ENCOUNTER — TELEPHONE (OUTPATIENT)
Dept: OBGYN UNIT | Facility: HOSPITAL | Age: 34
End: 2019-07-08

## 2019-07-08 ENCOUNTER — APPOINTMENT (OUTPATIENT)
Dept: MRI IMAGING | Facility: HOSPITAL | Age: 34
End: 2019-07-08
Attending: EMERGENCY MEDICINE
Payer: COMMERCIAL

## 2019-07-08 ENCOUNTER — APPOINTMENT (OUTPATIENT)
Dept: CV DIAGNOSTICS | Facility: HOSPITAL | Age: 34
End: 2019-07-08
Attending: HOSPITALIST
Payer: COMMERCIAL

## 2019-07-08 LAB
ANTIBODY SCREEN: NEGATIVE
ATRIAL RATE: 78 BPM
BASOPHILS # BLD AUTO: 0.05 X10(3) UL (ref 0–0.2)
BASOPHILS NFR BLD AUTO: 0.4 %
CHOLEST SMN-MCNC: 258 MG/DL (ref ?–200)
DEPRECATED RDW RBC AUTO: 44.3 FL (ref 35.1–46.3)
EOSINOPHIL # BLD AUTO: 0.03 X10(3) UL (ref 0–0.7)
EOSINOPHIL NFR BLD AUTO: 0.2 %
ERYTHROCYTE [DISTWIDTH] IN BLOOD BY AUTOMATED COUNT: 13.1 % (ref 11–15)
HCT VFR BLD AUTO: 39.2 % (ref 35–48)
HDLC SERPL-MCNC: 70 MG/DL (ref 40–59)
HGB BLD-MCNC: 13.3 G/DL (ref 12–16)
IMM GRANULOCYTES # BLD AUTO: 0.08 X10(3) UL (ref 0–1)
IMM GRANULOCYTES NFR BLD: 0.6 %
LDLC SERPL CALC-MCNC: 129 MG/DL (ref ?–100)
LYMPHOCYTES # BLD AUTO: 2.69 X10(3) UL (ref 1–4)
LYMPHOCYTES NFR BLD AUTO: 19.1 %
MCH RBC QN AUTO: 31.8 PG (ref 26–34)
MCHC RBC AUTO-ENTMCNC: 33.9 G/DL (ref 31–37)
MCV RBC AUTO: 93.8 FL (ref 80–100)
MONOCYTES # BLD AUTO: 0.77 X10(3) UL (ref 0.1–1)
MONOCYTES NFR BLD AUTO: 5.5 %
NEUTROPHILS # BLD AUTO: 10.5 X10 (3) UL (ref 1.5–7.7)
NEUTROPHILS # BLD AUTO: 10.5 X10(3) UL (ref 1.5–7.7)
NEUTROPHILS NFR BLD AUTO: 74.2 %
NONHDLC SERPL-MCNC: 188 MG/DL (ref ?–130)
P AXIS: 33 DEGREES
P-R INTERVAL: 130 MS
PLATELET # BLD AUTO: 209 10(3)UL (ref 150–450)
POTASSIUM SERPL-SCNC: 3.7 MMOL/L (ref 3.5–5.1)
Q-T INTERVAL: 404 MS
QRS DURATION: 100 MS
QTC CALCULATION (BEZET): 460 MS
R AXIS: 13 DEGREES
RBC # BLD AUTO: 4.18 X10(6)UL (ref 3.8–5.3)
RH BLOOD TYPE: NEGATIVE
T AXIS: 31 DEGREES
T PALLIDUM AB SER QL IA: NONREACTIVE
TRIGL SERPL-MCNC: 296 MG/DL (ref 30–149)
TROPONIN I SERPL-MCNC: 0.32 NG/ML (ref ?–0.04)
VENTRICULAR RATE: 78 BPM
VLDLC SERPL CALC-MCNC: 59 MG/DL (ref 0–30)
WBC # BLD AUTO: 14.1 X10(3) UL (ref 4–11)

## 2019-07-08 PROCEDURE — 93306 TTE W/DOPPLER COMPLETE: CPT | Performed by: HOSPITALIST

## 2019-07-08 PROCEDURE — 3E0P7VZ INTRODUCTION OF HORMONE INTO FEMALE REPRODUCTIVE, VIA NATURAL OR ARTIFICIAL OPENING: ICD-10-PCS | Performed by: OBSTETRICS & GYNECOLOGY

## 2019-07-08 PROCEDURE — 99254 IP/OBS CNSLTJ NEW/EST MOD 60: CPT | Performed by: INTERNAL MEDICINE

## 2019-07-08 PROCEDURE — 99233 SBSQ HOSP IP/OBS HIGH 50: CPT | Performed by: HOSPITALIST

## 2019-07-08 PROCEDURE — 70551 MRI BRAIN STEM W/O DYE: CPT | Performed by: EMERGENCY MEDICINE

## 2019-07-08 PROCEDURE — 99254 IP/OBS CNSLTJ NEW/EST MOD 60: CPT | Performed by: OTHER

## 2019-07-08 RX ORDER — METOCLOPRAMIDE HYDROCHLORIDE 5 MG/ML
10 INJECTION INTRAMUSCULAR; INTRAVENOUS EVERY 8 HOURS PRN
Status: DISCONTINUED | OUTPATIENT
Start: 2019-07-08 | End: 2019-07-09

## 2019-07-08 RX ORDER — ONDANSETRON 2 MG/ML
4 INJECTION INTRAMUSCULAR; INTRAVENOUS EVERY 6 HOURS PRN
Status: DISCONTINUED | OUTPATIENT
Start: 2019-07-08 | End: 2019-07-09

## 2019-07-08 RX ORDER — POTASSIUM CHLORIDE 20 MEQ/1
40 TABLET, EXTENDED RELEASE ORAL ONCE
Status: COMPLETED | OUTPATIENT
Start: 2019-07-08 | End: 2019-07-08

## 2019-07-08 RX ORDER — DEXTROSE, SODIUM CHLORIDE, SODIUM LACTATE, POTASSIUM CHLORIDE, AND CALCIUM CHLORIDE 5; .6; .31; .03; .02 G/100ML; G/100ML; G/100ML; G/100ML; G/100ML
INJECTION, SOLUTION INTRAVENOUS AS NEEDED
Status: DISCONTINUED | OUTPATIENT
Start: 2019-07-08 | End: 2019-07-09 | Stop reason: HOSPADM

## 2019-07-08 RX ORDER — IBUPROFEN 600 MG/1
600 TABLET ORAL ONCE AS NEEDED
Status: DISCONTINUED | OUTPATIENT
Start: 2019-07-08 | End: 2019-07-09 | Stop reason: HOSPADM

## 2019-07-08 RX ORDER — EPHEDRINE SULFATE/0.9% NACL/PF 25 MG/5 ML
5 SYRINGE (ML) INTRAVENOUS AS NEEDED
Status: DISCONTINUED | OUTPATIENT
Start: 2019-07-08 | End: 2019-07-09

## 2019-07-08 RX ORDER — ACETAMINOPHEN 325 MG/1
650 TABLET ORAL EVERY 6 HOURS PRN
Status: DISCONTINUED | OUTPATIENT
Start: 2019-07-08 | End: 2019-07-09

## 2019-07-08 RX ORDER — CHOLECALCIFEROL (VITAMIN D3) 25 MCG
1 TABLET,CHEWABLE ORAL DAILY
Status: DISCONTINUED | OUTPATIENT
Start: 2019-07-08 | End: 2019-07-09

## 2019-07-08 RX ORDER — POLYETHYLENE GLYCOL 3350 17 G/17G
17 POWDER, FOR SOLUTION ORAL DAILY PRN
Status: DISCONTINUED | OUTPATIENT
Start: 2019-07-08 | End: 2019-07-09

## 2019-07-08 RX ORDER — NALBUPHINE HCL 10 MG/ML
2.5 AMPUL (ML) INJECTION
Status: DISCONTINUED | OUTPATIENT
Start: 2019-07-08 | End: 2019-07-09

## 2019-07-08 RX ORDER — AMMONIA INHALANTS 0.04 G/.3ML
0.3 INHALANT RESPIRATORY (INHALATION) AS NEEDED
Status: DISCONTINUED | OUTPATIENT
Start: 2019-07-08 | End: 2019-07-09 | Stop reason: HOSPADM

## 2019-07-08 RX ORDER — TRISODIUM CITRATE DIHYDRATE AND CITRIC ACID MONOHYDRATE 500; 334 MG/5ML; MG/5ML
30 SOLUTION ORAL AS NEEDED
Status: DISCONTINUED | OUTPATIENT
Start: 2019-07-08 | End: 2019-07-09 | Stop reason: HOSPADM

## 2019-07-08 RX ORDER — DOCUSATE SODIUM 100 MG/1
100 CAPSULE, LIQUID FILLED ORAL 2 TIMES DAILY
Status: DISCONTINUED | OUTPATIENT
Start: 2019-07-08 | End: 2019-07-09

## 2019-07-08 RX ORDER — TERBUTALINE SULFATE 1 MG/ML
0.25 INJECTION, SOLUTION SUBCUTANEOUS AS NEEDED
Status: DISCONTINUED | OUTPATIENT
Start: 2019-07-08 | End: 2019-07-09 | Stop reason: HOSPADM

## 2019-07-08 RX ORDER — SODIUM CHLORIDE, SODIUM LACTATE, POTASSIUM CHLORIDE, CALCIUM CHLORIDE 600; 310; 30; 20 MG/100ML; MG/100ML; MG/100ML; MG/100ML
INJECTION, SOLUTION INTRAVENOUS CONTINUOUS
Status: DISCONTINUED | OUTPATIENT
Start: 2019-07-08 | End: 2019-07-09 | Stop reason: HOSPADM

## 2019-07-08 NOTE — PROGRESS NOTES
admitted to triage #3 with report of right sided numbness with blurred vision on right side for about a half an hour this afternoon around 1:45, which is now resolved but reports continued left sided headache that started around the same time.  Reports

## 2019-07-08 NOTE — PLAN OF CARE
NURSING ADMISSION NOTE      Patient admitted via cart. Oriented to room. Safety precautions initiated. Bed in low position. Call light in reach. Received pt at 0300. Tylenol given for headache. Blurred vision and R sided numbness resolved.   [facility] CARDIOVASCULAR - ADULT  Goal: Maintains optimal cardiac output and hemodynamic stability  Description  INTERVENTIONS:  - Monitor vital signs, rhythm, and trends  - Monitor for bleeding, hypotension and signs of decreased cardiac output  - Evaluate effectiv

## 2019-07-08 NOTE — CONSULTS
[address] [place] [address] with [facility]  7/8/2019    11:20 AM      35 yo SCOT who is 38 weeks pregnant and scheduled for labor induction tomorrow.   She was visiting [facility] yesterday and sometime in the aftern tobacco. She reports that she drinks alcohol. She reports that she does not use drugs.       EXAM:  /87 (BP Location: Right arm)   Pulse 81   Temp 98.1 °F (36.7 °C) (Oral)   Resp 18   Ht 67\"   Wt 283 lb 8 oz   LMP 10/09/2018   SpO2 99%   BMI 44.40 kg

## 2019-07-08 NOTE — CONSULTS
OB H&P    34yo  at 38w6d admitted to hospital for right sided visual caned, headache, and tingling of right arm. She came to hospital for evaluation. Since admission her symptoms have resolved. Her troponin was mildly elevated but stable.   Neurol (COLACE OR) Take by mouth. Disp:  Rfl:    acetaminophen 325 MG Oral Tab Take 325 mg by mouth every 6 (six) hours as needed for Pain.  Disp:  Rfl:        No Known Allergies      Family History   Problem Relation Age of Onset   • Cancer Maternal Aunt have elevated BP today - 152/87, then 145/80. She had a history of PIH with her previous pregnancy. Due to elevated BPs at term, recommend proceeding with induction today. Plan to transfer to L&D for initiation of induction. Ok to eat lunch.   Plan

## 2019-07-08 NOTE — PROGRESS NOTES
PT STATES SHE DOES not want to go to the ER dr Elex Pallas speaking to pt on the phone. Pt states she will go to the er now.  Pt discharged to the er per wheelchair

## 2019-07-08 NOTE — CONSULTS
[facility]  Cardiology Consultation    Chirag Fenton Patient Status:  Inpatient    1985 MRN [id number]   [facility] 7NE-A Attending Bere Martínez MD   Hosp Day # 0 PCP Uzma Boateng     Reason for Consultation:  Elevated that she does not use drugs.     Allergies:  No Known Allergies    Medications:    Current Facility-Administered Medications:   •  prenatal multivitamin plus DHA (PNV with DHA) cap 1 capsule, 1 capsule, Oral, Daily  •  acetaminophen (TYLENOL) tab 650 mg, 65 CREATSERUM 0.59 07/07/2019    BUN 9 07/07/2019     07/07/2019    K 3.7 07/08/2019     07/07/2019    CO2 20.0 07/07/2019    GLU 80 07/07/2019    CA 9.4 07/07/2019    ALB 2.7 07/07/2019    ALKPHO 151 07/07/2019    BILT 0.4 07/07/2019    TP 7.0 07 UFCT and peripheral vascular screening after recovers from pregnancy after a couple weeks. Thank you for allowing me to participate in the care of your patient.     Fly Kirby MD  7/8/2019  10:47 AM

## 2019-07-08 NOTE — PROGRESS NOTES
Pt is a 34year old female admitted to 80 from 7th floor  Pt  Is admitted for scheduled induction post complex migraine  Pt is a 38w6d intrauterine pregnancy    History obtained, consents, signed. Pt oriented to room, staff, and plan of care.

## 2019-07-08 NOTE — PLAN OF CARE
Patient awake, alert and oriented x4  Telemetry, Sinus Rhythm   C/o dull, aching headache 2/10; declines pharmacological and non-pharm interventions; denies nausea; vision disturbances  No acute focal neurological deficits  Echo completed this am  Fetal mo patient/family/discharge partner  - Complete POLST form as appropriate  - Assess patient's ability to be responsible for managing their own health  - Refer to Case Management Department for coordinating discharge planning if the patient needs post-hospital

## 2019-07-08 NOTE — H&P
[facility]  History and Physical     Lisa Jenelle Patient Status:  Emergency    1985 MRN [id number]   Location [address] [facility] Attending Keely Rand MD   Hosp Day # 0 PCP NIKUNJ DUMONT     Chief Complaint: headac Tab Take 325 mg by mouth every 6 (six) hours as needed for Pain. Disp:  Rfl:    prenatal multivitamin plus DHA 27-0.8-228 MG Oral Cap Take 1 capsule by mouth daily.  Disp:  Rfl:        Review of Systems:   A comprehensive 14 point review of systems was comp 1. Neuro  2. MRI/MRV   2. Elevated troponin, probable type II NSTEMI. No chest pain or signs of cardiomyopathy   1. Trend  2. Echo  3.  Cardiology   3. 38 weeks gestation     Quality:  · DVT Prophylaxis: scd  · CODE status: full  · [place]al: none    Plan of

## 2019-07-08 NOTE — ED INITIAL ASSESSMENT (HPI)
patient c/o this afternoon blurred vision out of right eye lasted for 15 mins and then her right hand and face went numb for about 15-30 mins and now has a headache on the left side. Patient is 38 weeks- cleared by OB.

## 2019-07-08 NOTE — PROGRESS NOTES
PT TO BE DISCHARGED TO THE ER FOR NEURO WORKUP PER DR SCHUMACHER, ER [facility] NOTIFIED OF PT STATUS

## 2019-07-08 NOTE — PROGRESS NOTES
[facility]  Progress Note     Nate Blackburn Patient Status:  Inpatient    1985 MRN [id number]   [facility] 7NE-A Attending Laurie Mann MD   Hosp Day # 0 PCP NIKUNJ DUMONT     Chief Complaint: rt sided numbness    S: Patient s Recent Labs   Lab 07/07/19  2151 07/08/19  0225   TROP 0.328* 0.319*            Imaging: Imaging data reviewed in Epic.     Medications:   • prenatal multivitamin plus DHA  1 capsule Oral Daily   • docusate sodium  100 mg Oral BID   • Potassium Chlori

## 2019-07-08 NOTE — ED PROVIDER NOTES
Patient Seen in: [facility] Emergency Department    History   Patient presents with:  Stroke (neurologic)    Stated Complaint: numbness on right side since 1400 sunday x 30 minutes  ob cleared and brought p*    HPI    Patient is a 30-year-old woman 134/80   Pulse 75   Temp 98.1 °F (36.7 °C) (Temporal)   Resp 17   Ht 170.2 cm (5' 7\")   Wt 128.4 kg   LMP 10/09/2018   SpO2 96%   BMI 44.32 kg/m²         Physical Exam   Constitutional: She is oriented to person, place, and time.  She appears well-develope Abnormality         Status                     ---------                               -----------         ------                     CBC W/ DIFFERENTIAL[[id number]]          Abnormal            Final result                 Plea

## 2019-07-08 NOTE — NST
Nonstress Test   Patient: Alondra Venegas    Gestation: 38w6d    NST:       Variability: Moderate           Accelerations: Yes           Decelerations: None            Baseline: 125 BPM           Uterine Irritability: No           Contractions: Not present

## 2019-07-08 NOTE — NST
Nonstress Test   Patient: Anette Freitas    Gestation: 38w5d    NST:       Variability: Moderate           Accelerations: Yes           Decelerations: None            Baseline: 120 BPM           Uterine Irritability: No           Contractions: Irregular

## 2019-07-08 NOTE — PLAN OF CARE
Problem: PAIN - ADULT  Goal: Verbalizes/displays adequate comfort level or patient's stated pain goal  Description  INTERVENTIONS:  - Encourage pt to monitor pain and request assistance  - Assess pain using appropriate pain scale  - Administer analgesics to optimize hemodynamic stability  - Monitor arterial and/or venous puncture sites for bleeding and/or hematoma  - Assess quality of pulses, skin color and temperature  - Assess for signs of decreased coronary artery perfusion - ex.  Angina  - Evaluate flui

## 2019-07-09 LAB — POTASSIUM SERPL-SCNC: 3.9 MMOL/L (ref 3.5–5.1)

## 2019-07-09 PROCEDURE — 0KQM0ZZ REPAIR PERINEUM MUSCLE, OPEN APPROACH: ICD-10-PCS | Performed by: OBSTETRICS & GYNECOLOGY

## 2019-07-09 PROCEDURE — 0UQMXZZ REPAIR VULVA, EXTERNAL APPROACH: ICD-10-PCS | Performed by: OBSTETRICS & GYNECOLOGY

## 2019-07-09 PROCEDURE — 99232 SBSQ HOSP IP/OBS MODERATE 35: CPT | Performed by: INTERNAL MEDICINE

## 2019-07-09 RX ORDER — HYDRALAZINE HYDROCHLORIDE 20 MG/ML
10 INJECTION INTRAMUSCULAR; INTRAVENOUS ONCE AS NEEDED
Status: DISCONTINUED | OUTPATIENT
Start: 2019-07-09 | End: 2019-07-11

## 2019-07-09 RX ORDER — DOCUSATE SODIUM 100 MG/1
100 CAPSULE, LIQUID FILLED ORAL
Status: DISCONTINUED | OUTPATIENT
Start: 2019-07-09 | End: 2019-07-11

## 2019-07-09 RX ORDER — LABETALOL HYDROCHLORIDE 5 MG/ML
80 INJECTION, SOLUTION INTRAVENOUS ONCE AS NEEDED
Status: DISCONTINUED | OUTPATIENT
Start: 2019-07-09 | End: 2019-07-11

## 2019-07-09 RX ORDER — LABETALOL HYDROCHLORIDE 5 MG/ML
20 INJECTION, SOLUTION INTRAVENOUS ONCE AS NEEDED
Status: COMPLETED | OUTPATIENT
Start: 2019-07-09 | End: 2019-07-09

## 2019-07-09 RX ORDER — IBUPROFEN 600 MG/1
600 TABLET ORAL EVERY 6 HOURS
Status: DISCONTINUED | OUTPATIENT
Start: 2019-07-09 | End: 2019-07-11

## 2019-07-09 RX ORDER — ZOLPIDEM TARTRATE 5 MG/1
5 TABLET ORAL NIGHTLY PRN
Status: DISCONTINUED | OUTPATIENT
Start: 2019-07-09 | End: 2019-07-11

## 2019-07-09 RX ORDER — ACETAMINOPHEN 325 MG/1
650 TABLET ORAL EVERY 6 HOURS PRN
Status: DISCONTINUED | OUTPATIENT
Start: 2019-07-09 | End: 2019-07-11

## 2019-07-09 RX ORDER — LABETALOL HYDROCHLORIDE 5 MG/ML
INJECTION, SOLUTION INTRAVENOUS
Status: DISPENSED
Start: 2019-07-09 | End: 2019-07-09

## 2019-07-09 RX ORDER — BISACODYL 10 MG
10 SUPPOSITORY, RECTAL RECTAL ONCE AS NEEDED
Status: ACTIVE | OUTPATIENT
Start: 2019-07-09 | End: 2019-07-09

## 2019-07-09 RX ORDER — LABETALOL HYDROCHLORIDE 5 MG/ML
40 INJECTION, SOLUTION INTRAVENOUS ONCE AS NEEDED
Status: DISCONTINUED | OUTPATIENT
Start: 2019-07-09 | End: 2019-07-11

## 2019-07-09 RX ORDER — SIMETHICONE 80 MG
80 TABLET,CHEWABLE ORAL 3 TIMES DAILY PRN
Status: DISCONTINUED | OUTPATIENT
Start: 2019-07-09 | End: 2019-07-11

## 2019-07-09 NOTE — PROGRESS NOTES
Patient up to bathroom with assist x 2. Voiding without difficulty. Lorene care completed. Patient transferred to mother/baby room 1114 via wheelchair in stable condition with baby and personal belongings. Accompanied by significant other and staff.   Repor

## 2019-07-09 NOTE — PROGRESS NOTES
OB PN    Went into labor on cervidil.   Comfortable with epidural.    /75   Pulse 94   Temp 98.5 °F (36.9 °C) (Temporal)   Resp 20   Ht 5' 7\" (1.702 m)   Wt 285 lb (129.3 kg)   LMP 10/09/2018   SpO2 100%   BMI 44.64 kg/m²   EFM: 135/mod/+acc/-dec  To

## 2019-07-09 NOTE — PLAN OF CARE
Problem: PAIN - ADULT  Goal: Verbalizes/displays adequate comfort level or patient's stated pain goal  Description  INTERVENTIONS:  - Encourage pt to monitor pain and request assistance  - Assess pain using appropriate pain scale  - Administer analgesics frequently for physical needs  - Identify cognitive and physical deficits and behaviors that affect risk of falls.   - [place] fall precautions as indicated by assessment.  - Educate pt/family on patient safety including physical limitations  - Instruct p

## 2019-07-09 NOTE — L&D DELIVERY NOTE
Gareth Akbar Dunia Le [[id number]]    Labor Events     labor?:  No   steroids?:  None  Cervical ripening date/time:  2019 1556  Cervical ripening type:  Cervidil  Antibiotics received during labor?:  No  Rupture date/time:  2019 6883 RN  Initial count Tech:  Isauro Valdez    Initial counts 11   0    Final counts                                                                          Vaginal Delivery Note          Diony Dennis Patient Status:  Inpatient   DO

## 2019-07-09 NOTE — PROGRESS NOTES
[facility]  Cardiology Progress Note    Subjective:  No chest pain or shortness of breath. Patient is resting comfortably with her  baby girl.      Objective:  /74   Pulse 94   Temp 98.4 °F (36.9 °C) (Oral)   Resp 18   Ht 5' 7\" (1.702 m)

## 2019-07-10 LAB
BASOPHILS # BLD AUTO: 0.07 X10(3) UL (ref 0–0.2)
BASOPHILS NFR BLD AUTO: 0.5 %
DEPRECATED RDW RBC AUTO: 47.8 FL (ref 35.1–46.3)
EOSINOPHIL # BLD AUTO: 0.09 X10(3) UL (ref 0–0.7)
EOSINOPHIL NFR BLD AUTO: 0.7 %
ERYTHROCYTE [DISTWIDTH] IN BLOOD BY AUTOMATED COUNT: 13.5 % (ref 11–15)
HCT VFR BLD AUTO: 36.6 % (ref 35–48)
HGB BLD-MCNC: 12.1 G/DL (ref 12–16)
IMM GRANULOCYTES # BLD AUTO: 0.13 X10(3) UL (ref 0–1)
IMM GRANULOCYTES NFR BLD: 1 %
LYMPHOCYTES # BLD AUTO: 3.52 X10(3) UL (ref 1–4)
LYMPHOCYTES NFR BLD AUTO: 26.5 %
MCH RBC QN AUTO: 32 PG (ref 26–34)
MCHC RBC AUTO-ENTMCNC: 33.1 G/DL (ref 31–37)
MCV RBC AUTO: 96.8 FL (ref 80–100)
MONOCYTES # BLD AUTO: 0.74 X10(3) UL (ref 0.1–1)
MONOCYTES NFR BLD AUTO: 5.6 %
NEUTROPHILS # BLD AUTO: 8.73 X10 (3) UL (ref 1.5–7.7)
NEUTROPHILS # BLD AUTO: 8.73 X10(3) UL (ref 1.5–7.7)
NEUTROPHILS NFR BLD AUTO: 65.7 %
PLATELET # BLD AUTO: 185 10(3)UL (ref 150–450)
RBC # BLD AUTO: 3.78 X10(6)UL (ref 3.8–5.3)
WBC # BLD AUTO: 13.3 X10(3) UL (ref 4–11)

## 2019-07-10 NOTE — PROGRESS NOTES
Patient complaints: none. States all neurologic symptoms resolved w/no recurrence    Vital signs reviewed    Examination:  General: alert, appropriate affect  Abdomen: Fundus firm and no unexpected tenderness.   Remainder of abdomen unremarkable  Lochia: ap

## 2019-07-11 VITALS
HEIGHT: 67 IN | WEIGHT: 285 LBS | HEART RATE: 70 BPM | SYSTOLIC BLOOD PRESSURE: 117 MMHG | OXYGEN SATURATION: 100 % | BODY MASS INDEX: 44.73 KG/M2 | RESPIRATION RATE: 18 BRPM | DIASTOLIC BLOOD PRESSURE: 82 MMHG | TEMPERATURE: 98 F

## 2019-07-11 NOTE — PROGRESS NOTES
Postpartum Day 2    Pt without complaints.     Temp:  [97.7 °F (36.5 °C)-98.7 °F (37.1 °C)] 98.7 °F (37.1 °C)  Pulse:  [67-92] 68  Resp:  [17-18] 18  BP: (126-144)/(70-80) 144/80   Patient Vitals for the past 24 hrs:   BP Temp Temp src Pulse Resp   07/11/19

## 2019-07-12 PROBLEM — O35.8XX0 PYELECTASIS OF FETUS ON PRENATAL ULTRASOUND: Status: RESOLVED | Noted: 2019-03-26 | Resolved: 2019-07-09

## 2019-07-12 PROBLEM — O44.00 PLACENTA PREVIA WITHOUT HEMORRHAGE, ANTEPARTUM (HCC): Status: RESOLVED | Noted: 2019-02-28 | Resolved: 2019-07-09

## 2019-07-12 PROBLEM — O35.EXX0 PYELECTASIS OF FETUS ON PRENATAL ULTRASOUND: Status: RESOLVED | Noted: 2019-03-26 | Resolved: 2019-07-09

## 2019-07-12 PROBLEM — E66.01 MORBID OBESITY WITH BMI OF 40.0-44.9, ADULT (HCC): Status: RESOLVED | Noted: 2019-03-26 | Resolved: 2019-07-09

## 2019-07-12 PROBLEM — O44.00 PLACENTA PREVIA WITHOUT HEMORRHAGE, ANTEPARTUM: Status: RESOLVED | Noted: 2019-02-28 | Resolved: 2019-07-09

## 2019-07-12 PROBLEM — O35.EXX0 PYELECTASIS OF FETUS ON PRENATAL ULTRASOUND (HCC): Status: RESOLVED | Noted: 2019-03-26 | Resolved: 2019-07-09

## 2019-07-15 ENCOUNTER — TELEPHONE (OUTPATIENT)
Dept: OBGYN UNIT | Facility: HOSPITAL | Age: 34
End: 2019-07-15

## 2019-09-26 ENCOUNTER — APPOINTMENT (OUTPATIENT)
Dept: DERMATOLOGY | Age: 34
End: 2019-09-26

## 2021-04-08 ENCOUNTER — HOSPITAL ENCOUNTER (EMERGENCY)
Facility: HOSPITAL | Age: 36
Discharge: HOME | End: 2021-04-09
Attending: EMERGENCY MEDICINE
Payer: COMMERCIAL

## 2021-04-08 ENCOUNTER — APPOINTMENT (OUTPATIENT)
Dept: CT IMAGING | Facility: HOSPITAL | Age: 36
End: 2021-04-08
Attending: EMERGENCY MEDICINE
Payer: COMMERCIAL

## 2021-04-08 VITALS
HEIGHT: 67 IN | OXYGEN SATURATION: 100 % | RESPIRATION RATE: 17 BRPM | BODY MASS INDEX: 27 KG/M2 | HEART RATE: 66 BPM | TEMPERATURE: 98 F | SYSTOLIC BLOOD PRESSURE: 131 MMHG | DIASTOLIC BLOOD PRESSURE: 68 MMHG | WEIGHT: 172 LBS

## 2021-04-08 DIAGNOSIS — R10.9 ABDOMINAL PAIN, UNSPECIFIED ABDOMINAL LOCATION: Primary | ICD-10-CM

## 2021-04-08 DIAGNOSIS — T18.9XXA SWALLOWED FOREIGN BODY, INITIAL ENCOUNTER: ICD-10-CM

## 2021-04-08 PROCEDURE — 81025 URINE PREGNANCY TEST: CPT

## 2021-04-08 PROCEDURE — 36415 COLL VENOUS BLD VENIPUNCTURE: CPT

## 2021-04-08 PROCEDURE — 80053 COMPREHEN METABOLIC PANEL: CPT | Performed by: EMERGENCY MEDICINE

## 2021-04-08 PROCEDURE — 83690 ASSAY OF LIPASE: CPT | Performed by: EMERGENCY MEDICINE

## 2021-04-08 PROCEDURE — 81001 URINALYSIS AUTO W/SCOPE: CPT | Performed by: EMERGENCY MEDICINE

## 2021-04-08 PROCEDURE — 85025 COMPLETE CBC W/AUTO DIFF WBC: CPT | Performed by: EMERGENCY MEDICINE

## 2021-04-08 PROCEDURE — 74177 CT ABD & PELVIS W/CONTRAST: CPT | Performed by: EMERGENCY MEDICINE

## 2021-04-08 PROCEDURE — 99284 EMERGENCY DEPT VISIT MOD MDM: CPT

## 2021-04-09 NOTE — ED PROVIDER NOTES
Patient Seen in: [facility] Emergency Department      History   Patient presents with:  Abdomen/Flank Pain    Stated Complaint: swallowed broken glass at 1230 today, now abd pain    HPI/Subjective:   HPI    Patient is a 61-year-old female coming wit negative except as noted above.     Physical Exam     ED Triage Vitals [04/08/21 2145]   /68   Pulse 68   Resp 16   Temp 98.1 °F (36.7 °C)   Temp src Temporal   SpO2 98 %   O2 Device None (Room air)       Current:/68   Pulse 68   Temp 98.1 °F (3 All other components within normal limits   LIPASE - Normal   CBC WITH DIFFERENTIAL WITH PLATELET    Narrative: The following orders were created for panel order CBC WITH DIFFERENTIAL WITH PLATELET.   Procedure                               Abnormali in the stomach and     diffusely in the bowel which is probably related to recently ingested     meal.  There is also moderate amount of retained fecal debris in the colon     consistent with constipation. There     is no free air.   There is mild amount o abdominal location  (primary encounter diagnosis)  Swallowed foreign body, initial encounter     Disposition:  Discharge  4/8/2021 11:47 pm    Follow-up:  Padmini Waddell  [address] [place] [address]  [phone]    Schedule an appointment

## 2021-04-09 NOTE — ED INITIAL ASSESSMENT (HPI)
Pt reports eating her salad and then while eating noticed that the glass container that she was eating out of had a piece missing and after a few bites of her salad noticed shards of glass in her salad.  Pt reports having some pain in her throat and lower a

## 2021-06-29 ENCOUNTER — OFFICE VISIT (OUTPATIENT)
Dept: UROLOGY | Facility: HOSPITAL | Age: 36
End: 2021-06-29
Attending: OBSTETRICS & GYNECOLOGY
Payer: COMMERCIAL

## 2021-06-29 VITALS — TEMPERATURE: 97 F | BODY MASS INDEX: 26.68 KG/M2 | WEIGHT: 170 LBS | RESPIRATION RATE: 16 BRPM | HEIGHT: 67 IN

## 2021-06-29 DIAGNOSIS — N81.84 PELVIC MUSCLE WASTING: ICD-10-CM

## 2021-06-29 DIAGNOSIS — N39.3 SUI (STRESS URINARY INCONTINENCE, FEMALE): Primary | ICD-10-CM

## 2021-06-29 DIAGNOSIS — N39.41 URGE INCONTINENCE: ICD-10-CM

## 2021-06-29 PROCEDURE — 99212 OFFICE O/P EST SF 10 MIN: CPT

## 2021-06-29 NOTE — PROGRESS NOTES
Patient presents to follow up UI    She is currently c/o CASSANDRA & UUI  Completed childbearing  Some constipation  H/o fistula repair    Denies dyspareunia  Completed childbearing    Temp 97.4 °F (36.3 °C)   Resp 16   Ht 67\"   Wt 170 lb (77.1 kg)   LMP 03/25/

## 2021-07-08 ENCOUNTER — TELEPHONE (OUTPATIENT)
Dept: UROLOGY | Facility: HOSPITAL | Age: 36
End: 2021-07-08

## 2021-07-08 DIAGNOSIS — N39.3 FEMALE STRESS INCONTINENCE: Primary | ICD-10-CM

## 2021-07-08 DIAGNOSIS — N39.41 URGE INCONTINENCE: ICD-10-CM

## 2021-07-08 DIAGNOSIS — N81.84 PELVIC MUSCLE WASTING: ICD-10-CM

## 2021-07-08 NOTE — TELEPHONE ENCOUNTER
Patient needed PT referral changed to an external location and to be fax'd to a [facility] location, completed

## 2021-09-28 ENCOUNTER — OFFICE VISIT (OUTPATIENT)
Dept: UROLOGY | Facility: HOSPITAL | Age: 36
End: 2021-09-28
Attending: OBSTETRICS & GYNECOLOGY
Payer: COMMERCIAL

## 2021-09-28 VITALS — HEIGHT: 67 IN | BODY MASS INDEX: 28.25 KG/M2 | WEIGHT: 180 LBS | TEMPERATURE: 98 F

## 2021-09-28 DIAGNOSIS — N39.41 URGE INCONTINENCE: ICD-10-CM

## 2021-09-28 DIAGNOSIS — N81.84 PELVIC MUSCLE WASTING: Primary | ICD-10-CM

## 2021-09-28 DIAGNOSIS — N39.3 FEMALE STRESS INCONTINENCE: ICD-10-CM

## 2021-09-28 PROCEDURE — 99212 OFFICE O/P EST SF 10 MIN: CPT

## 2021-09-28 RX ORDER — POLYETHYLENE GLYCOL 3350 17 G/17G
17 POWDER, FOR SOLUTION ORAL DAILY
COMMUNITY
End: 2022-01-27

## 2021-09-28 NOTE — PROGRESS NOTES
Patient presents to follow up UI    She is currently using pelvic floor PT    She reports 80% improvement     She continues to c/o constipation  +gas  Some sense of incomplete evacuation with daily fiber and miralax    Temp 97.5 °F (36.4 °C)   Ht 67\"   Wt

## 2021-11-10 ENCOUNTER — HOSPITAL ENCOUNTER (OUTPATIENT)
Age: 36
Discharge: HOME | End: 2021-11-10
Payer: COMMERCIAL

## 2021-11-10 VITALS
HEIGHT: 67 IN | SYSTOLIC BLOOD PRESSURE: 124 MMHG | TEMPERATURE: 98 F | OXYGEN SATURATION: 98 % | BODY MASS INDEX: 28.09 KG/M2 | RESPIRATION RATE: 16 BRPM | HEART RATE: 69 BPM | WEIGHT: 179 LBS | DIASTOLIC BLOOD PRESSURE: 85 MMHG

## 2021-11-10 DIAGNOSIS — Z11.52 ENCOUNTER FOR SCREENING FOR COVID-19: ICD-10-CM

## 2021-11-10 DIAGNOSIS — B34.9 VIRAL SYNDROME: Primary | ICD-10-CM

## 2021-11-10 PROCEDURE — U0002 COVID-19 LAB TEST NON-CDC: HCPCS | Performed by: PHYSICIAN ASSISTANT

## 2021-11-10 PROCEDURE — 99203 OFFICE O/P NEW LOW 30 MIN: CPT | Performed by: PHYSICIAN ASSISTANT

## 2021-11-10 NOTE — ED INITIAL ASSESSMENT (HPI)
Pt here c/o sore throat, congestion, cough, h/a and diarrhea. Onset of symptoms started on Sunday. States feeling more fatigued.     Denies n/v.

## 2021-11-10 NOTE — ED PROVIDER NOTES
Patient Seen in: [facility] [address] [facility]      History   Patient presents with:  Cough/URI    Stated Complaint: sore thoat headache     Subjective:   HPI    19-year-old female presents to the IC with sore throat, headache, congestion for 3 days.   Has a few erythema. Eyes:      Conjunctiva/sclera: Conjunctivae normal.   Cardiovascular:      Rate and Rhythm: Normal rate and regular rhythm. Pulmonary:      Effort: Pulmonary effort is normal.      Breath sounds: Normal breath sounds.    Musculoskeletal:

## 2022-01-27 ENCOUNTER — OFFICE VISIT (OUTPATIENT)
Dept: FAMILY MEDICINE CLINIC | Facility: CLINIC | Age: 37
End: 2022-01-27
Payer: COMMERCIAL

## 2022-01-27 VITALS
BODY MASS INDEX: 27.92 KG/M2 | TEMPERATURE: 98 F | DIASTOLIC BLOOD PRESSURE: 78 MMHG | HEART RATE: 78 BPM | RESPIRATION RATE: 18 BRPM | WEIGHT: 180 LBS | OXYGEN SATURATION: 98 % | SYSTOLIC BLOOD PRESSURE: 114 MMHG | HEIGHT: 67.5 IN

## 2022-01-27 DIAGNOSIS — R53.83 OTHER FATIGUE: ICD-10-CM

## 2022-01-27 DIAGNOSIS — F43.9 SITUATIONAL STRESS: ICD-10-CM

## 2022-01-27 DIAGNOSIS — F32.81 PMDD (PREMENSTRUAL DYSPHORIC DISORDER): ICD-10-CM

## 2022-01-27 DIAGNOSIS — K59.04 CHRONIC IDIOPATHIC CONSTIPATION: Primary | ICD-10-CM

## 2022-01-27 PROBLEM — R77.8 ELEVATED TROPONIN: Status: RESOLVED | Noted: 2019-07-07 | Resolved: 2022-01-27

## 2022-01-27 PROBLEM — Z67.91 RH NEGATIVE STATE IN ANTEPARTUM PERIOD: Status: RESOLVED | Noted: 2019-01-23 | Resolved: 2022-01-27

## 2022-01-27 PROBLEM — O26.899 RH NEGATIVE STATE IN ANTEPARTUM PERIOD (HCC): Status: RESOLVED | Noted: 2019-01-23 | Resolved: 2022-01-27

## 2022-01-27 PROBLEM — Z86.14 HISTORY OF MRSA INFECTION: Status: RESOLVED | Noted: 2019-01-23 | Resolved: 2022-01-27

## 2022-01-27 PROBLEM — Z87.59 HISTORY OF DELIVERY OF MACROSOMAL INFANT: Status: RESOLVED | Noted: 2019-01-23 | Resolved: 2022-01-27

## 2022-01-27 PROBLEM — Z67.91 RH NEGATIVE STATE IN ANTEPARTUM PERIOD (HCC): Status: RESOLVED | Noted: 2019-01-23 | Resolved: 2022-01-27

## 2022-01-27 PROBLEM — O26.899 RH NEGATIVE STATE IN ANTEPARTUM PERIOD: Status: RESOLVED | Noted: 2019-01-23 | Resolved: 2022-01-27

## 2022-01-27 PROBLEM — O99.210 OBESITY AFFECTING PREGNANCY, ANTEPARTUM (HCC): Status: RESOLVED | Noted: 2019-01-23 | Resolved: 2022-01-27

## 2022-01-27 PROBLEM — R79.89 ELEVATED TROPONIN: Status: RESOLVED | Noted: 2019-07-07 | Resolved: 2022-01-27

## 2022-01-27 PROBLEM — Z34.90 PREGNANCY (HCC): Status: RESOLVED | Noted: 2019-07-07 | Resolved: 2022-01-27

## 2022-01-27 PROBLEM — O99.210 OBESITY AFFECTING PREGNANCY, ANTEPARTUM: Status: RESOLVED | Noted: 2019-01-23 | Resolved: 2022-01-27

## 2022-01-27 PROBLEM — Z34.90 PREGNANCY: Status: RESOLVED | Noted: 2019-07-07 | Resolved: 2022-01-27

## 2022-01-27 LAB
ALBUMIN SERPL-MCNC: 4.3 G/DL (ref 3.4–5)
ALBUMIN/GLOB SERPL: 1.5 {RATIO} (ref 1–2)
ALP LIVER SERPL-CCNC: 37 U/L
ALT SERPL-CCNC: 27 U/L
ANION GAP SERPL CALC-SCNC: 4 MMOL/L (ref 0–18)
AST SERPL-CCNC: 14 U/L (ref 15–37)
BASOPHILS # BLD AUTO: 0.06 X10(3) UL (ref 0–0.2)
BASOPHILS NFR BLD AUTO: 0.8 %
BILIRUB SERPL-MCNC: 0.5 MG/DL (ref 0.1–2)
BUN BLD-MCNC: 17 MG/DL (ref 7–18)
CALCIUM BLD-MCNC: 9.1 MG/DL (ref 8.5–10.1)
CHLORIDE SERPL-SCNC: 107 MMOL/L (ref 98–112)
CO2 SERPL-SCNC: 27 MMOL/L (ref 21–32)
CREAT BLD-MCNC: 0.74 MG/DL
EOSINOPHIL # BLD AUTO: 0.05 X10(3) UL (ref 0–0.7)
EOSINOPHIL NFR BLD AUTO: 0.6 %
ERYTHROCYTE [DISTWIDTH] IN BLOOD BY AUTOMATED COUNT: 11.8 %
FASTING STATUS PATIENT QL REPORTED: YES
GLOBULIN PLAS-MCNC: 2.9 G/DL (ref 2.8–4.4)
GLUCOSE BLD-MCNC: 86 MG/DL (ref 70–99)
HCT VFR BLD AUTO: 41.2 %
HGB BLD-MCNC: 14.2 G/DL
IMM GRANULOCYTES # BLD AUTO: 0.02 X10(3) UL (ref 0–1)
IMM GRANULOCYTES NFR BLD: 0.3 %
LYMPHOCYTES # BLD AUTO: 2.9 X10(3) UL (ref 1–4)
LYMPHOCYTES NFR BLD AUTO: 37.5 %
MCH RBC QN AUTO: 33.8 PG (ref 26–34)
MCHC RBC AUTO-ENTMCNC: 34.5 G/DL (ref 31–37)
MCV RBC AUTO: 98.1 FL
MONOCYTES # BLD AUTO: 0.67 X10(3) UL (ref 0.1–1)
MONOCYTES NFR BLD AUTO: 8.7 %
NEUTROPHILS # BLD AUTO: 4.03 X10 (3) UL (ref 1.5–7.7)
NEUTROPHILS # BLD AUTO: 4.03 X10(3) UL (ref 1.5–7.7)
NEUTROPHILS NFR BLD AUTO: 52.1 %
OSMOLALITY SERPL CALC.SUM OF ELEC: 287 MOSM/KG (ref 275–295)
PLATELET # BLD AUTO: 194 10(3)UL (ref 150–450)
POTASSIUM SERPL-SCNC: 4.2 MMOL/L (ref 3.5–5.1)
PROT SERPL-MCNC: 7.2 G/DL (ref 6.4–8.2)
RBC # BLD AUTO: 4.2 X10(6)UL
SODIUM SERPL-SCNC: 138 MMOL/L (ref 136–145)
T4 FREE SERPL-MCNC: 0.9 NG/DL (ref 0.8–1.7)
TSI SER-ACNC: 1.29 MIU/ML (ref 0.36–3.74)
VIT D+METAB SERPL-MCNC: 20.8 NG/ML (ref 30–100)
WBC # BLD AUTO: 7.7 X10(3) UL (ref 4–11)

## 2022-01-27 PROCEDURE — 84439 ASSAY OF FREE THYROXINE: CPT | Performed by: NURSE PRACTITIONER

## 2022-01-27 PROCEDURE — 3008F BODY MASS INDEX DOCD: CPT | Performed by: NURSE PRACTITIONER

## 2022-01-27 PROCEDURE — 86003 ALLG SPEC IGE CRUDE XTRC EA: CPT | Performed by: NURSE PRACTITIONER

## 2022-01-27 PROCEDURE — 82785 ASSAY OF IGE: CPT | Performed by: NURSE PRACTITIONER

## 2022-01-27 PROCEDURE — 3078F DIAST BP <80 MM HG: CPT | Performed by: NURSE PRACTITIONER

## 2022-01-27 PROCEDURE — 82306 VITAMIN D 25 HYDROXY: CPT | Performed by: NURSE PRACTITIONER

## 2022-01-27 PROCEDURE — 3074F SYST BP LT 130 MM HG: CPT | Performed by: NURSE PRACTITIONER

## 2022-01-27 PROCEDURE — 80050 GENERAL HEALTH PANEL: CPT | Performed by: NURSE PRACTITIONER

## 2022-01-27 PROCEDURE — 99203 OFFICE O/P NEW LOW 30 MIN: CPT | Performed by: NURSE PRACTITIONER

## 2022-01-27 RX ORDER — DROSPIRENONE AND ETHINYL ESTRADIOL 0.02-3(28)
1 KIT ORAL DAILY
Qty: 90 TABLET | Refills: 0 | Status: SHIPPED | OUTPATIENT
Start: 2022-01-27 | End: 2022-04-27

## 2022-01-27 NOTE — PROGRESS NOTES
HPI: HPI   Patient is here to establish care. Has been experiencing constipation since May 2021. Was also having a lot of chin outbreaks. Saw OB and was diagnosed with PMDD. Henny Rodriguez for 2-3 months but had 2 periods per month.  Switched to another OCP [facility]   Constitutional: Positive for fatigue. Negative for appetite change, chills, fever and unexpected weight change. Respiratory: Negative for cough and shortness of breath. Cardiovascular: Negative for chest pain and palpitations.    Gastrointest

## 2022-01-28 ENCOUNTER — TELEPHONE (OUTPATIENT)
Dept: FAMILY MEDICINE CLINIC | Facility: CLINIC | Age: 37
End: 2022-01-28

## 2022-01-28 RX ORDER — ERGOCALCIFEROL 1.25 MG/1
50000 CAPSULE ORAL WEEKLY
Qty: 4 CAPSULE | Refills: 2 | Status: SHIPPED | OUTPATIENT
Start: 2022-01-28 | End: 2022-02-27

## 2022-01-28 NOTE — TELEPHONE ENCOUNTER
----- Message from OLIVER Hand sent at 1/28/2022  7:12 AM CST -----  -Thyroid normal  -Vitamin D low. Start weekly Vitamin D, sent to pharmacy.  Recheck 3 mo  -Chemistries normal  -No anemia or signs of infection    Awaiting food allergy panel

## 2022-01-28 NOTE — TELEPHONE ENCOUNTER
Pt sent a mychart asking about the thyroid so I sent a mychart response to the pt with the notes from the Luis Carlos Hernandez NP

## 2022-01-31 ENCOUNTER — TELEPHONE (OUTPATIENT)
Dept: FAMILY MEDICINE CLINIC | Facility: CLINIC | Age: 37
End: 2022-01-31

## 2022-01-31 LAB
CLAM IGE QN: <0.1 KUA/L (ref ?–0.1)
CODFISH IGE QN: <0.1 KUA/L (ref ?–0.1)
CORN IGE QN: <0.1 KUA/L (ref ?–0.1)
COW MILK IGE QN: <0.1 KUA/L (ref ?–0.1)
EGG WHITE IGE QN: <0.1 KUA/L (ref ?–0.1)
IGE SERPL-ACNC: 3.82 KU/L (ref 2–214)
PEANUT IGE QN: <0.1 KUA/L (ref ?–0.1)
SCALLOP IGE QN: <0.1 KUA/L (ref ?–0.1)
SESAME SEED IGE QN: <0.1 KUA/L (ref ?–0.1)
SHRIMP IGE QN: <0.1 KUA/L (ref ?–0.1)
SOYBEAN IGE QN: <0.1 KUA/L (ref ?–0.1)
WALNUT IGE QN: <0.1 KUA/L (ref ?–0.1)
WHEAT IGE QN: <0.1 KUA/L (ref ?–0.1)

## 2022-01-31 NOTE — TELEPHONE ENCOUNTER
Pt notified. She did not choose a PCP yet. She does not have a preference - states we can choose one for her.

## 2022-01-31 NOTE — TELEPHONE ENCOUNTER
----- Message from OLIVER Mayo sent at 1/31/2022  3:33 PM CST -----  Results reviewed. No food allergies noted  Who did patient choose as PCP?

## 2022-02-02 ENCOUNTER — PATIENT MESSAGE (OUTPATIENT)
Dept: FAMILY MEDICINE CLINIC | Facility: CLINIC | Age: 37
End: 2022-02-02

## 2022-02-03 NOTE — TELEPHONE ENCOUNTER
From: Fabiana Oconnell  To: OLIVER Horton  Sent: 2/2/2022 2:35 PM CST  Subject: Low FODMAP    Hi! I remember you mentioning the low FODMAP diet at my appointment last week but for some reason now I can't remember what you said about it. Ilsa Sprinkles Ilsa Sprinkles I noticed today after I ate an apple at the end of my lunch, my stomach started to really hurt. Naturally I went ahead and Googled it and low FODMAP came up.  Thanks I'm advance for your help!  -Johana Downs-

## 2022-03-04 ENCOUNTER — PATIENT MESSAGE (OUTPATIENT)
Dept: FAMILY MEDICINE CLINIC | Facility: CLINIC | Age: 37
End: 2022-03-04

## 2022-03-04 NOTE — TELEPHONE ENCOUNTER
From: Yarely Nicole  To: OLIVER Vanessa  Sent: 3/4/2022 8:57 AM CST  Subject: Vision Issue    Good Morning! I wanted to reach out to you because I just had a weird vision thing happen and I'm not sure if I should try to come see you, go to the ER, or go to the eye doctor. I had some flashing in both eyes and lost some of the peripheral vision in my left eye for probably 30 minutes. My vision is back to normal now but my head hurts and I feel really tired. It was similar to something that happened 2 days before I had my daughter in 2019 and I ended up in the ER and on the cardiac floor back then. Everything checked out fine and they decided it was a migraine. With the episode in 2019, I also had some right-sided facial/hand numbness and which didn't happen this time. I haven't had this happen since or before July 2019. Any input is appreciated!  Thank you!  -Delicia Wheeler-

## 2022-03-04 NOTE — TELEPHONE ENCOUNTER
I would recommend calling eye doctor and see if they can get her in today. If not, should be seen. Could go to IC/ER. Needs to pick PCP. Please let patient know I unfortunately will no longer be in the [place] office. I'm happy to see her in [place] if she wishes.

## 2022-03-09 ENCOUNTER — LAB ENCOUNTER (OUTPATIENT)
Dept: LAB | Age: 37
End: 2022-03-09
Attending: INTERNAL MEDICINE
Payer: COMMERCIAL

## 2022-03-09 DIAGNOSIS — R14.0 BLOATING: ICD-10-CM

## 2022-03-09 DIAGNOSIS — Z86.14 HISTORY OF MRSA INFECTION: ICD-10-CM

## 2022-03-09 LAB — IGA SERPL-MCNC: 173 MG/DL (ref 70–312)

## 2022-03-09 PROCEDURE — 87081 CULTURE SCREEN ONLY: CPT

## 2022-03-09 PROCEDURE — 36415 COLL VENOUS BLD VENIPUNCTURE: CPT

## 2022-03-09 PROCEDURE — 86364 TISS TRNSGLTMNASE EA IG CLAS: CPT

## 2022-03-09 PROCEDURE — 82784 ASSAY IGA/IGD/IGG/IGM EACH: CPT

## 2022-03-10 ENCOUNTER — LAB ENCOUNTER (OUTPATIENT)
Dept: LAB | Age: 37
End: 2022-03-10
Attending: INTERNAL MEDICINE
Payer: COMMERCIAL

## 2022-03-10 DIAGNOSIS — Z86.14 HISTORY OF MRSA INFECTION: ICD-10-CM

## 2022-03-10 PROCEDURE — 87081 CULTURE SCREEN ONLY: CPT

## 2022-03-11 ENCOUNTER — LAB ENCOUNTER (OUTPATIENT)
Dept: LAB | Age: 37
End: 2022-03-11
Attending: INTERNAL MEDICINE
Payer: COMMERCIAL

## 2022-03-11 DIAGNOSIS — Z86.14 HISTORY OF MRSA INFECTION: ICD-10-CM

## 2022-03-11 LAB — TTG IGA SER-ACNC: 0.3 U/ML (ref ?–7)

## 2022-03-11 PROCEDURE — 87081 CULTURE SCREEN ONLY: CPT

## 2022-03-28 ENCOUNTER — HOSPITAL ENCOUNTER (OUTPATIENT)
Age: 37
Discharge: HOME | End: 2022-03-28
Payer: COMMERCIAL

## 2022-03-28 VITALS
TEMPERATURE: 98 F | SYSTOLIC BLOOD PRESSURE: 124 MMHG | DIASTOLIC BLOOD PRESSURE: 79 MMHG | RESPIRATION RATE: 16 BRPM | OXYGEN SATURATION: 97 % | HEART RATE: 66 BPM

## 2022-03-28 DIAGNOSIS — J01.00 ACUTE NON-RECURRENT MAXILLARY SINUSITIS: Primary | ICD-10-CM

## 2022-03-28 LAB — SARS-COV-2 RNA RESP QL NAA+PROBE: NOT DETECTED

## 2022-03-28 PROCEDURE — U0002 COVID-19 LAB TEST NON-CDC: HCPCS | Performed by: NURSE PRACTITIONER

## 2022-03-28 PROCEDURE — 99213 OFFICE O/P EST LOW 20 MIN: CPT | Performed by: NURSE PRACTITIONER

## 2022-03-28 RX ORDER — AMOXICILLIN AND CLAVULANATE POTASSIUM 875; 125 MG/1; MG/1
1 TABLET, FILM COATED ORAL 2 TIMES DAILY
Qty: 20 TABLET | Refills: 0 | Status: SHIPPED | OUTPATIENT
Start: 2022-03-28 | End: 2022-04-07

## 2022-03-28 RX ORDER — METHYLPREDNISOLONE 4 MG/1
TABLET ORAL
Qty: 1 EACH | Refills: 0 | Status: SHIPPED | OUTPATIENT
Start: 2022-03-28

## 2022-04-02 ENCOUNTER — PATIENT MESSAGE (OUTPATIENT)
Dept: FAMILY MEDICINE CLINIC | Facility: CLINIC | Age: 37
End: 2022-04-02

## 2022-04-02 NOTE — TELEPHONE ENCOUNTER
Please advise - patient taking Augmentin and steroid lucy  Still having a lot of nasal drainage  Okay to advise patient to finish antibiotic course and then follow up if not better?

## 2022-04-02 NOTE — TELEPHONE ENCOUNTER
From: Ronan Lai  To: María Sidhu MD  Sent: 4/2/2022 9:04 AM CDT  Subject: Sinus Infection    Good Morning Dr. Norma Foy! I know I haven't had an appointment with you yet but I did have an appointment with Laura Chavez and listed you as my primary care doctor. I was seen at urgent care Monday for a sinus infection and immediately started amoxicillin and a steroid (can't remember the name). I feel like my sinuses are still very irritated and I'm still blowing my nose a lot. Should I make an appointment with you or should we try a different antibiotic? My whole family is on amoxicillin for either sinus infections or ear infections right now and everybody else seems to be on the mend.  Thank you!  -Soy Philippe-

## 2022-04-02 NOTE — TELEPHONE ENCOUNTER
Finish antibiotics. She can take zyrtec for symptoms relief. Call on Monday and see how she is feeling.

## 2022-04-04 ENCOUNTER — TELEPHONE (OUTPATIENT)
Dept: FAMILY MEDICINE CLINIC | Facility: CLINIC | Age: 37
End: 2022-04-04

## 2022-04-04 NOTE — TELEPHONE ENCOUNTER
Patient states she is feeling much better and feels the zyrtec is helping. Advised if sx worsen again or change to call us. Verbalized understanding. Routing to Dr Evaristo Muñiz as [id number] Isael Lucio.

## 2022-04-04 NOTE — TELEPHONE ENCOUNTER
----- Message from Meryle Carina, RN sent at 4/2/2022 11:32 AM CDT -----  Call patient Monday for condition update

## 2022-04-18 RX ORDER — ERGOCALCIFEROL 1.25 MG/1
CAPSULE ORAL
Qty: 12 CAPSULE | Refills: 0 | OUTPATIENT
Start: 2022-04-18

## 2022-04-18 NOTE — TELEPHONE ENCOUNTER
This was a 12w medication. Rx refused.   Future Appointments   Date Time Provider Nena Jones   6/2/2022  3:00 PM Jose Larson [facility] GI

## 2022-07-26 ENCOUNTER — NURSE ONLY (OUTPATIENT)
Dept: FAMILY MEDICINE CLINIC | Facility: CLINIC | Age: 37
End: 2022-07-26
Payer: COMMERCIAL

## 2022-07-26 ENCOUNTER — TELEPHONE (OUTPATIENT)
Dept: FAMILY MEDICINE CLINIC | Facility: CLINIC | Age: 37
End: 2022-07-26

## 2022-07-26 DIAGNOSIS — R79.89 LOW VITAMIN D LEVEL: Primary | ICD-10-CM

## 2022-07-26 DIAGNOSIS — R79.89 LOW VITAMIN D LEVEL: ICD-10-CM

## 2022-07-26 DIAGNOSIS — R79.89 LOW TSH LEVEL: ICD-10-CM

## 2022-07-26 LAB
TSI SER-ACNC: 0.67 MIU/ML (ref 0.36–3.74)
VIT D+METAB SERPL-MCNC: 34 NG/ML (ref 30–100)

## 2022-07-26 PROCEDURE — 82306 VITAMIN D 25 HYDROXY: CPT | Performed by: FAMILY MEDICINE

## 2022-07-26 PROCEDURE — 84443 ASSAY THYROID STIM HORMONE: CPT | Performed by: FAMILY MEDICINE

## 2022-07-26 NOTE — PROGRESS NOTES
Aleida Wolfe presents today for nurse visit. Labs ordered by Dr Corry Snyder. 1 gold, 1 light green drawn from left ac area with 1 attempt with straight needle. Patient tolerated well. Left office in stable condition.

## 2022-07-26 NOTE — TELEPHONE ENCOUNTER
Pt scheduled labs   Future Appointments   Date Time Provider Nena Jones   7/26/2022  3:00 PM [facility] NURSE [facility] [place]   8/4/2022 12:30 PM Caridad Orozco DO [id number] [address]

## 2022-07-26 NOTE — TELEPHONE ENCOUNTER
Due for repeat TSH and Vit D  Future Appointments   Date Time Provider Nena Jones   8/4/2022 12:30 PM Thiago Goldstein [facility] sent

## 2022-08-04 PROBLEM — R19.8 CHANGE IN BOWEL FUNCTION: Status: ACTIVE | Noted: 2022-08-04

## 2022-08-04 PROBLEM — R14.1 ABDOMINAL GAS PAIN: Status: ACTIVE | Noted: 2022-08-04

## 2022-08-04 PROBLEM — K59.00 CONSTIPATION: Status: ACTIVE | Noted: 2022-08-04

## 2022-09-08 DIAGNOSIS — F32.81 PMDD (PREMENSTRUAL DYSPHORIC DISORDER): ICD-10-CM

## 2022-09-08 RX ORDER — DROSPIRENONE AND ETHINYL ESTRADIOL 0.02-3(28)
1 KIT ORAL DAILY
Qty: 90 TABLET | Refills: 0 | Status: SHIPPED | OUTPATIENT
Start: 2022-09-08 | End: 2022-12-07

## 2022-11-16 ENCOUNTER — TELEPHONE (OUTPATIENT)
Dept: FAMILY MEDICINE CLINIC | Facility: CLINIC | Age: 37
End: 2022-11-16

## 2022-11-16 ENCOUNTER — HOSPITAL ENCOUNTER (OUTPATIENT)
Age: 37
Discharge: HOME | End: 2022-11-16
Payer: COMMERCIAL

## 2022-11-16 VITALS
RESPIRATION RATE: 16 BRPM | TEMPERATURE: 99 F | DIASTOLIC BLOOD PRESSURE: 72 MMHG | HEART RATE: 74 BPM | SYSTOLIC BLOOD PRESSURE: 118 MMHG | OXYGEN SATURATION: 100 %

## 2022-11-16 DIAGNOSIS — J10.1 INFLUENZA A: Primary | ICD-10-CM

## 2022-11-16 LAB
POCT INFLUENZA A: POSITIVE
POCT INFLUENZA B: NEGATIVE

## 2022-11-16 PROCEDURE — 87502 INFLUENZA DNA AMP PROBE: CPT | Performed by: NURSE PRACTITIONER

## 2022-11-16 PROCEDURE — 99213 OFFICE O/P EST LOW 20 MIN: CPT | Performed by: NURSE PRACTITIONER

## 2022-11-16 RX ORDER — OSELTAMIVIR PHOSPHATE 75 MG/1
75 CAPSULE ORAL 2 TIMES DAILY
Qty: 10 CAPSULE | Refills: 0 | Status: SHIPPED | OUTPATIENT
Start: 2022-11-16 | End: 2022-11-21

## 2022-11-16 NOTE — TELEPHONE ENCOUNTER
Patient states yesterday flu sx started  covid negative  Fever today 100-ibuprofen helped  Body aches  Chills  Cough  Headache  Slight sore throat  Post nasal drip  Would like to be swabbed for flu etc  Advised we do not have rapid testing  Advised to go [facility] or    Verbalized understanding.

## 2022-11-16 NOTE — TELEPHONE ENCOUNTER
PATIENT HAS SYMPTOMS OF THE FLU/  FEVER/HA/BODY ACHES/CHILLS/COUGH  SHOULD COME IN TO GET A FLU TEST/ [facility]     PLEASE ADVISE

## 2022-11-28 ENCOUNTER — PATIENT MESSAGE (OUTPATIENT)
Dept: FAMILY MEDICINE CLINIC | Facility: CLINIC | Age: 37
End: 2022-11-28

## 2022-11-28 ENCOUNTER — TELEMEDICINE (OUTPATIENT)
Dept: FAMILY MEDICINE CLINIC | Facility: CLINIC | Age: 37
End: 2022-11-28
Payer: COMMERCIAL

## 2022-11-28 ENCOUNTER — TELEPHONE (OUTPATIENT)
Dept: FAMILY MEDICINE CLINIC | Facility: CLINIC | Age: 37
End: 2022-11-28

## 2022-11-28 DIAGNOSIS — J01.00 ACUTE NON-RECURRENT MAXILLARY SINUSITIS: Primary | ICD-10-CM

## 2022-11-28 RX ORDER — AMOXICILLIN AND CLAVULANATE POTASSIUM 875; 125 MG/1; MG/1
1 TABLET, FILM COATED ORAL 2 TIMES DAILY
Qty: 20 TABLET | Refills: 0 | Status: SHIPPED | OUTPATIENT
Start: 2022-11-28 | End: 2022-12-08

## 2022-11-28 NOTE — TELEPHONE ENCOUNTER
Spoke with pt. VV scheduled.   Future Appointments   Date Time Provider Nena Sheridani   11/28/2022 12:30 PM Vanessa Nguyen MD [facility] [place]

## 2022-11-28 NOTE — TELEPHONE ENCOUNTER
From: Emilee Perera  To: Kaci Dixon MD  Sent: 11/28/2022 9:30 AM CST  Subject: Question regarding POCT FLU TEST    Hi! I tested positive for the flu on 11/16 and I'm still having a lot of sinus congestion/pressure. How would I know if it's a sinus infection at this point?  Thanks!  -Augusto Odell-

## 2022-12-11 ENCOUNTER — PATIENT MESSAGE (OUTPATIENT)
Dept: FAMILY MEDICINE CLINIC | Facility: CLINIC | Age: 37
End: 2022-12-11

## 2022-12-12 ENCOUNTER — HOSPITAL ENCOUNTER (OUTPATIENT)
Age: 37
Discharge: HOME | End: 2022-12-12
Payer: COMMERCIAL

## 2022-12-12 VITALS
WEIGHT: 190 LBS | TEMPERATURE: 98 F | BODY MASS INDEX: 30 KG/M2 | OXYGEN SATURATION: 100 % | DIASTOLIC BLOOD PRESSURE: 80 MMHG | HEART RATE: 88 BPM | RESPIRATION RATE: 14 BRPM | SYSTOLIC BLOOD PRESSURE: 131 MMHG

## 2022-12-12 DIAGNOSIS — R19.5 LOOSE STOOLS: ICD-10-CM

## 2022-12-12 DIAGNOSIS — R05.1 ACUTE COUGH: Primary | ICD-10-CM

## 2022-12-12 LAB — SARS-COV-2 RNA RESP QL NAA+PROBE: NOT DETECTED

## 2022-12-12 PROCEDURE — 99213 OFFICE O/P EST LOW 20 MIN: CPT | Performed by: PHYSICIAN ASSISTANT

## 2022-12-12 PROCEDURE — U0002 COVID-19 LAB TEST NON-CDC: HCPCS | Performed by: PHYSICIAN ASSISTANT

## 2022-12-12 NOTE — ED INITIAL ASSESSMENT (HPI)
Flu back in November - with worsening congestion 10 days after. Patient feels that congestion has not improved, when she woke up this morning she felt that her heart rate was high and she felt \"weird. \" Denies fevers.

## 2022-12-12 NOTE — DISCHARGE INSTRUCTIONS
Send in a stool culture if your diarrhea worsens, has blood in it, has change in color, lasts more than 4-5 days, or different from your IBS symptoms. If you develop a fever, return for evaluation. Monitor your heart rate, stay hydrated.

## 2022-12-12 NOTE — TELEPHONE ENCOUNTER
VV 11/28/22 for sinusitis. She was Rx'd Autmentin BID x10 days. No improvement. Please see [facility] and advise.

## 2022-12-12 NOTE — TELEPHONE ENCOUNTER
From: Tong Law  Sent: 12/11/2022 8:25 PM CST  To: Melinda Gold Clinical Staff  Subject: Question regarding POCT FLU TEST    Hello! I had a video visit with Dr. Barbara Walker regarding this and she decided it was a sinus infection and started me on antibiotics. My symptoms stayed basically the same until I got to the last couple days of the antibiotics and then got worse--I'm assuming I have another cold/virus now because I also have a cough. I did a home COVID test and it was negative. I've done some home remedies for relief along with Mucinex and I feel like nothing is helping relieve the sinus pressure. I'm just feeling pretty miserable and was wondering if Dr. Barbara Walker would be able to see me in the office some time this week or if I should go to urgent care.  Thanks so much for reading all of this!  -Kenroy Christopher-

## 2022-12-13 PROCEDURE — 87493 C DIFF AMPLIFIED PROBE: CPT | Performed by: PHYSICIAN ASSISTANT

## 2022-12-13 PROCEDURE — 87427 SHIGA-LIKE TOXIN AG IA: CPT | Performed by: PHYSICIAN ASSISTANT

## 2022-12-13 PROCEDURE — 87046 STOOL CULTR AEROBIC BACT EA: CPT | Performed by: PHYSICIAN ASSISTANT

## 2022-12-13 PROCEDURE — 87045 FECES CULTURE AEROBIC BACT: CPT | Performed by: PHYSICIAN ASSISTANT

## 2022-12-14 LAB — C DIFF TOX B STL QL: NEGATIVE

## 2022-12-19 ENCOUNTER — TELEPHONE (OUTPATIENT)
Dept: FAMILY MEDICINE CLINIC | Facility: CLINIC | Age: 37
End: 2022-12-19

## 2022-12-19 ENCOUNTER — PATIENT MESSAGE (OUTPATIENT)
Dept: FAMILY MEDICINE CLINIC | Facility: CLINIC | Age: 37
End: 2022-12-19

## 2022-12-19 NOTE — TELEPHONE ENCOUNTER
----- Message from Sundar Davenport sent at 12/19/2022  3:53 PM CST -----  Regarding: Question regarding POCT FLU TEST  Hi! Just following up on this again this week. I went to urgent care last Monday and they said to just monitor everything, chalked it up to a virus, and sent me home. I did end up sending in stool samples and those all came back negative for everything. I'm mostly feeling better but I still feel like something isn't right. My heart rate is elevated and it feels like it's pounding in my chest, I can feel mucus or something in my lungs that's been difficult to cough up, and my head is still feeling stuffed up. Is there any way I can get an appointment sometime this week? Feel free to call me at [phone].

## 2022-12-19 NOTE — TELEPHONE ENCOUNTER
Had VV in November for sinus infection-prescribed abx  Went to IC last week for nausea/diarrhea/elevated HR  Nausea/diarrhea has resolved  IC recommended OTC cold and sinus meds-don't help  States not feeling any better  States HR is 103 while standing-very worried  Feels SOB all day  Chest pressure  No chest pain  Sinus pressure  Cough-sometimes productive  No fever  IC did stool stuides-normal  States didn't work out last week  Worked out this morning with weights and cardio-states was difficult and HR was ok during workout-but felt SOB  Please advise

## 2022-12-19 NOTE — TELEPHONE ENCOUNTER
Spoke with Dr Petra Monreal 11:15 6 [facility] if feeling worse  Patient declined  Scheduled for 2/28  Advised if sx worsen go to ER. Verbalized understanding.     Future Appointments   Date Time Provider Nena Jones   12/28/2022  1:15 PM Cameron Barron MD [facility]

## 2022-12-19 NOTE — TELEPHONE ENCOUNTER
From: Jaswinder Coronel  Sent: 12/19/2022 3:53 PM CST  To: Dorcas Johns Clinical Staff  Subject: Question regarding POCT FLU TEST    Hi! Just following up on this again this week. I went to urgent care last Monday and they said to just monitor everything, chalked it up to a virus, and sent me home. I did end up sending in stool samples and those all came back negative for everything. I'm mostly feeling better but I still feel like something isn't right. My heart rate is elevated and it feels like it's pounding in my chest, I can feel mucus or something in my lungs that's been difficult to cough up, and my head is still feeling stuffed up. Is there any way I can get an appointment sometime this week? Feel free to call me at [phone].

## 2022-12-20 NOTE — TELEPHONE ENCOUNTER
From: Aleida Wolfe  Sent: 12/19/2022 6:14 PM CST  To: Eulalia Polk Clinical Staff  Subject: Question regarding POCT FLU TEST    Sorry to message again. Should I discontinue working out/exercising until I see Dr. Corry Snyder? I took last week off and didn't feel like it helped the heart rate issue. I worked out this morning and felt OKAY for the most part. Thanks!

## 2022-12-20 NOTE — TELEPHONE ENCOUNTER
Delivery Read From Message Type Attachments Subject   12/20/2022 10:06 AM Leilani Means RN Patient Medical Advice Request  Question regarding POCT

## 2022-12-28 ENCOUNTER — OFFICE VISIT (OUTPATIENT)
Dept: FAMILY MEDICINE CLINIC | Facility: CLINIC | Age: 37
End: 2022-12-28
Payer: COMMERCIAL

## 2022-12-28 VITALS
BODY MASS INDEX: 30.61 KG/M2 | RESPIRATION RATE: 18 BRPM | WEIGHT: 195 LBS | DIASTOLIC BLOOD PRESSURE: 80 MMHG | SYSTOLIC BLOOD PRESSURE: 112 MMHG | HEART RATE: 82 BPM | HEIGHT: 67 IN | TEMPERATURE: 99 F | OXYGEN SATURATION: 98 %

## 2022-12-28 DIAGNOSIS — R00.2 PALPITATION: Primary | ICD-10-CM

## 2022-12-28 DIAGNOSIS — Z82.49 FH: CAD (CORONARY ARTERY DISEASE): ICD-10-CM

## 2022-12-28 LAB
ATRIAL RATE: 64 BPM
P AXIS: 47 DEGREES
P-R INTERVAL: 150 MS
Q-T INTERVAL: 410 MS
QRS DURATION: 106 MS
QTC CALCULATION (BEZET): 422 MS
R AXIS: 37 DEGREES
T AXIS: 50 DEGREES
VENTRICULAR RATE: 64 BPM

## 2022-12-28 PROCEDURE — 93000 ELECTROCARDIOGRAM COMPLETE: CPT | Performed by: FAMILY MEDICINE

## 2022-12-28 PROCEDURE — 99214 OFFICE O/P EST MOD 30 MIN: CPT | Performed by: FAMILY MEDICINE

## 2022-12-28 PROCEDURE — 3008F BODY MASS INDEX DOCD: CPT | Performed by: FAMILY MEDICINE

## 2022-12-28 PROCEDURE — 3074F SYST BP LT 130 MM HG: CPT | Performed by: FAMILY MEDICINE

## 2022-12-28 PROCEDURE — 3079F DIAST BP 80-89 MM HG: CPT | Performed by: FAMILY MEDICINE

## 2022-12-29 ENCOUNTER — NURSE ONLY (OUTPATIENT)
Dept: FAMILY MEDICINE CLINIC | Facility: CLINIC | Age: 37
End: 2022-12-29
Payer: COMMERCIAL

## 2022-12-29 DIAGNOSIS — Z82.49 FH: CAD (CORONARY ARTERY DISEASE): ICD-10-CM

## 2022-12-29 DIAGNOSIS — R00.2 PALPITATION: ICD-10-CM

## 2022-12-29 LAB
ALBUMIN SERPL-MCNC: 3.8 G/DL (ref 3.4–5)
ALBUMIN/GLOB SERPL: 1.2 {RATIO} (ref 1–2)
ALP LIVER SERPL-CCNC: 55 U/L
ALT SERPL-CCNC: 35 U/L
ANION GAP SERPL CALC-SCNC: 5 MMOL/L (ref 0–18)
AST SERPL-CCNC: 31 U/L (ref 15–37)
BASOPHILS # BLD AUTO: 0.04 X10(3) UL (ref 0–0.2)
BASOPHILS NFR BLD AUTO: 0.6 %
BILIRUB SERPL-MCNC: 0.8 MG/DL (ref 0.1–2)
BUN BLD-MCNC: 15 MG/DL (ref 7–18)
CALCIUM BLD-MCNC: 9 MG/DL (ref 8.5–10.1)
CHLORIDE SERPL-SCNC: 108 MMOL/L (ref 98–112)
CHOLEST SERPL-MCNC: 146 MG/DL (ref ?–200)
CO2 SERPL-SCNC: 26 MMOL/L (ref 21–32)
CREAT BLD-MCNC: 0.66 MG/DL
EOSINOPHIL # BLD AUTO: 0.05 X10(3) UL (ref 0–0.7)
EOSINOPHIL NFR BLD AUTO: 0.8 %
ERYTHROCYTE [DISTWIDTH] IN BLOOD BY AUTOMATED COUNT: 11.9 %
EST. AVERAGE GLUCOSE BLD GHB EST-MCNC: 91 MG/DL (ref 68–126)
FASTING PATIENT LIPID ANSWER: YES
FASTING STATUS PATIENT QL REPORTED: YES
GFR SERPLBLD BASED ON 1.73 SQ M-ARVRAT: 117 ML/MIN/1.73M2 (ref 60–?)
GLOBULIN PLAS-MCNC: 3.1 G/DL (ref 2.8–4.4)
GLUCOSE BLD-MCNC: 95 MG/DL (ref 70–99)
HBA1C MFR BLD: 4.8 % (ref ?–5.7)
HCT VFR BLD AUTO: 41.1 %
HDLC SERPL-MCNC: 65 MG/DL (ref 40–59)
HGB BLD-MCNC: 14.4 G/DL
IMM GRANULOCYTES # BLD AUTO: 0.01 X10(3) UL (ref 0–1)
IMM GRANULOCYTES NFR BLD: 0.2 %
LDLC SERPL CALC-MCNC: 70 MG/DL (ref ?–100)
LYMPHOCYTES # BLD AUTO: 1.63 X10(3) UL (ref 1–4)
LYMPHOCYTES NFR BLD AUTO: 25.7 %
MAGNESIUM SERPL-MCNC: 2.1 MG/DL (ref 1.6–2.6)
MCH RBC QN AUTO: 34 PG (ref 26–34)
MCHC RBC AUTO-ENTMCNC: 35 G/DL (ref 31–37)
MCV RBC AUTO: 96.9 FL
MONOCYTES # BLD AUTO: 0.61 X10(3) UL (ref 0.1–1)
MONOCYTES NFR BLD AUTO: 9.6 %
NEUTROPHILS # BLD AUTO: 4.01 X10 (3) UL (ref 1.5–7.7)
NEUTROPHILS # BLD AUTO: 4.01 X10(3) UL (ref 1.5–7.7)
NEUTROPHILS NFR BLD AUTO: 63.1 %
NONHDLC SERPL-MCNC: 81 MG/DL (ref ?–130)
OSMOLALITY SERPL CALC.SUM OF ELEC: 289 MOSM/KG (ref 275–295)
PLATELET # BLD AUTO: 207 10(3)UL (ref 150–450)
POTASSIUM SERPL-SCNC: 4 MMOL/L (ref 3.5–5.1)
PROT SERPL-MCNC: 6.9 G/DL (ref 6.4–8.2)
RBC # BLD AUTO: 4.24 X10(6)UL
SODIUM SERPL-SCNC: 139 MMOL/L (ref 136–145)
TRIGL SERPL-MCNC: 50 MG/DL (ref 30–149)
TSI SER-ACNC: 1.04 MIU/ML (ref 0.36–3.74)
VLDLC SERPL CALC-MCNC: 8 MG/DL (ref 0–30)
WBC # BLD AUTO: 6.4 X10(3) UL (ref 4–11)

## 2022-12-29 PROCEDURE — 80050 GENERAL HEALTH PANEL: CPT | Performed by: FAMILY MEDICINE

## 2022-12-29 PROCEDURE — 83735 ASSAY OF MAGNESIUM: CPT | Performed by: FAMILY MEDICINE

## 2022-12-29 PROCEDURE — 80061 LIPID PANEL: CPT | Performed by: FAMILY MEDICINE

## 2022-12-29 PROCEDURE — 83036 HEMOGLOBIN GLYCOSYLATED A1C: CPT | Performed by: FAMILY MEDICINE

## 2022-12-29 NOTE — PROGRESS NOTES
Omar Lang presents today for nurse visit. Labs ordered by Dr. Lawrence Smith. Lavender and gold tube drawn from left ac area with a butterfly needle. Patient tolerated well. Left office in stable condition.

## 2023-01-24 ENCOUNTER — PATIENT MESSAGE (OUTPATIENT)
Dept: FAMILY MEDICINE CLINIC | Facility: CLINIC | Age: 38
End: 2023-01-24

## 2023-01-24 NOTE — TELEPHONE ENCOUNTER
From: Rosa Night  To: Ellie Mccoy MD  Sent: 1/24/2023 7:38 AM CST  Subject: 4 Week Follow Up     Good Morning Dr. Lukasz Jaimes! This week is 4 weeks since my appointment in December. You wanted me to follow up with my symptom diary in 4 weeks and I wasn't sure if it should be here, via a message, or if I should try to get an appointment for this week.  Let me know!  -Janeal Ards-

## 2023-02-17 ENCOUNTER — PATIENT MESSAGE (OUTPATIENT)
Dept: FAMILY MEDICINE CLINIC | Facility: CLINIC | Age: 38
End: 2023-02-17

## 2023-02-17 NOTE — TELEPHONE ENCOUNTER
rec trial of zoloft since its been a long time. Med will take 3-4 weeks to work. F/u in 4 weeks, sooner prn. rx sent.

## 2023-02-17 NOTE — TELEPHONE ENCOUNTER
From: Manju Jade  To: Hannah Black MD  Sent: 2/17/2023 4:29 AM CST  Subject: Medication    Hi Dr. Abby Hernandez! After taking the last couple weeks to think about our discussion regarding possibly starting medication for depression, I think I would like to try it. I thought about your two questions and my answers are yes and yes. I was on Zoloft for a time in college and remember it not being particularly helpful. Obviously, that was some time ago so it could be different now but I did just want to mention that.  Please feel free to call me to discuss this further!  -Vanda Lawrence-

## 2023-02-27 ENCOUNTER — TELEPHONE (OUTPATIENT)
Dept: FAMILY MEDICINE CLINIC | Facility: CLINIC | Age: 38
End: 2023-02-27

## 2023-03-28 ENCOUNTER — PATIENT MESSAGE (OUTPATIENT)
Dept: FAMILY MEDICINE CLINIC | Facility: CLINIC | Age: 38
End: 2023-03-28

## 2023-03-28 NOTE — TELEPHONE ENCOUNTER
zoloft can cause gi se like diarrhea abd pain bloating so I would recommend we stop medication and have her follow up with Dr. Constance Hazel next wk for other options of meds. She does not need to taper it since she only took it for less then 1 month. If her gi se don't get better in few days stopping meds please call office. If sym get worse call sooner or go to ic.

## 2023-03-28 NOTE — TELEPHONE ENCOUNTER
From: Willow Collazo  To: Enzo Lloyd MD  Sent: 3/28/2023 8:32 AM CDT  Subject: Zoloft Side Effect    Hi Dr. Shashi Almendarez! I've been having some stomach issues lately (gas, bloating, diarrhea) and mostly in the morning after I've taken my medication. I'm wondering if the Zoloft could be causing that or maybe if it's not interacting well with the Linzess? Today I'm in a lot of pain from the gas.  I haven't changed anything with my diet either and the Zoloft was the only change I could think of.  -Antonino Subramanian-

## 2023-04-26 ENCOUNTER — OFFICE VISIT (OUTPATIENT)
Dept: FAMILY MEDICINE CLINIC | Facility: CLINIC | Age: 38
End: 2023-04-26
Payer: COMMERCIAL

## 2023-04-26 VITALS
HEIGHT: 67 IN | OXYGEN SATURATION: 98 % | SYSTOLIC BLOOD PRESSURE: 114 MMHG | HEART RATE: 83 BPM | DIASTOLIC BLOOD PRESSURE: 80 MMHG | BODY MASS INDEX: 30.45 KG/M2 | TEMPERATURE: 98 F | RESPIRATION RATE: 16 BRPM | WEIGHT: 194 LBS

## 2023-04-26 DIAGNOSIS — K52.9 GASTROENTERITIS: Primary | ICD-10-CM

## 2023-04-26 DIAGNOSIS — F41.9 ANXIETY: ICD-10-CM

## 2023-04-26 PROCEDURE — 3074F SYST BP LT 130 MM HG: CPT | Performed by: FAMILY MEDICINE

## 2023-04-26 PROCEDURE — 3008F BODY MASS INDEX DOCD: CPT | Performed by: FAMILY MEDICINE

## 2023-04-26 PROCEDURE — 3079F DIAST BP 80-89 MM HG: CPT | Performed by: FAMILY MEDICINE

## 2023-04-26 PROCEDURE — 99214 OFFICE O/P EST MOD 30 MIN: CPT | Performed by: FAMILY MEDICINE

## 2023-04-26 RX ORDER — ESCITALOPRAM OXALATE 10 MG/1
10 TABLET ORAL DAILY
Qty: 30 TABLET | Refills: 0 | Status: SHIPPED | OUTPATIENT
Start: 2023-04-26

## 2023-05-18 RX ORDER — ESCITALOPRAM OXALATE 10 MG/1
TABLET ORAL
Qty: 30 TABLET | Refills: 3 | Status: SHIPPED | OUTPATIENT
Start: 2023-05-18

## 2023-08-07 ENCOUNTER — PATIENT MESSAGE (OUTPATIENT)
Dept: FAMILY MEDICINE CLINIC | Facility: CLINIC | Age: 38
End: 2023-08-07

## 2023-08-07 NOTE — TELEPHONE ENCOUNTER
From: Willow Collazo  To: Enzo Lloyd MD  Sent: 8/7/2023 8:43 AM CDT  Subject: Timbo Almendarez,  I wanted to reach out about Lexapro. I've been on it for a bit over 3 months now and I really don't feel any different. I have also gained about 15 pounds. ..after a lot of thinking, I want to stop taking it and not try to find a replacement at this time. What are your recommendations at this time?  Thanks!  -Antonino Subramanian-

## 2023-08-08 NOTE — TELEPHONE ENCOUNTER
Wean off Lexapro. Take Half tablet for 1 week, then stop. Monitor your symptoms for anxiety and depression closely for the next 6 weeks.   Call back if wants to try another medication

## 2023-08-17 ENCOUNTER — PATIENT MESSAGE (OUTPATIENT)
Dept: FAMILY MEDICINE CLINIC | Facility: CLINIC | Age: 38
End: 2023-08-17

## 2023-08-18 NOTE — TELEPHONE ENCOUNTER
From: Danielle Mcghee  To: Candis Raphael MD  Sent: 8/17/2023 6:28 PM CDT  Subject: Sinus Infection    Hi Dr. Argelia Schroeder! I have a cold that started a little over a week ago and I'm 99% sure that I have a sinus infection. I have a lot of sinus pressure and pain, lots of gunk coming out of my nose when I blow, daily headaches, and it doesn't seem to be getting any better.  Do I need to make an appointment to come in or can I get antibiotics called in?  -Micah Castillo-

## 2023-08-18 NOTE — TELEPHONE ENCOUNTER
Appointment scheduled.   .  Future Appointments   Date Time Provider Nena Jones   8/19/2023 11:30 AM OLIVER [facility]W [facility]

## 2023-08-19 ENCOUNTER — OFFICE VISIT (OUTPATIENT)
Dept: FAMILY MEDICINE CLINIC | Facility: CLINIC | Age: 38
End: 2023-08-19
Payer: COMMERCIAL

## 2023-08-19 VITALS — TEMPERATURE: 98 F | HEART RATE: 82 BPM | OXYGEN SATURATION: 98 %

## 2023-08-19 DIAGNOSIS — J01.10 ACUTE NON-RECURRENT FRONTAL SINUSITIS: Primary | ICD-10-CM

## 2023-08-19 PROCEDURE — 99213 OFFICE O/P EST LOW 20 MIN: CPT | Performed by: NURSE PRACTITIONER

## 2023-08-19 RX ORDER — AMOXICILLIN AND CLAVULANATE POTASSIUM 875; 125 MG/1; MG/1
1 TABLET, FILM COATED ORAL 2 TIMES DAILY
Qty: 20 TABLET | Refills: 0 | Status: SHIPPED | OUTPATIENT
Start: 2023-08-19 | End: 2023-08-29

## 2023-08-24 ENCOUNTER — PATIENT MESSAGE (OUTPATIENT)
Dept: FAMILY MEDICINE CLINIC | Facility: CLINIC | Age: 38
End: 2023-08-24

## 2023-08-30 ENCOUNTER — PATIENT OUTREACH (OUTPATIENT)
Dept: FAMILY MEDICINE CLINIC | Facility: CLINIC | Age: 38
End: 2023-08-30

## 2023-10-16 ENCOUNTER — TELEPHONE (OUTPATIENT)
Dept: FAMILY MEDICINE CLINIC | Facility: CLINIC | Age: 38
End: 2023-10-16

## 2023-10-16 NOTE — TELEPHONE ENCOUNTER
Pt scheduled VV on [facility] with Rhoda on   Future Appointments   Date Time Provider Department Center   10/17/2023 11:20 AM Lola Oh APRN [facility] [place]   11/9/2023  5:00 PM Lola Oh APRN [facility] [place]     For \"Bad cold with little improvement since it started 6 days ago. Possible sinus infection.\"    Ok to keep as vv or does this need to be in person? Please advise

## 2023-10-16 NOTE — TELEPHONE ENCOUNTER
Pt rescheduled for ov with Rhoda for   Future Appointments   Date Time Provider Department Center   10/18/2023  4:40 PM Lola Oh APRN [facility] [place]   11/9/2023  5:00 PM Lola Oh APRN [facility] [place]

## 2023-10-18 ENCOUNTER — OFFICE VISIT (OUTPATIENT)
Dept: FAMILY MEDICINE CLINIC | Facility: CLINIC | Age: 38
End: 2023-10-18
Payer: COMMERCIAL

## 2023-10-18 VITALS
HEIGHT: 67 IN | SYSTOLIC BLOOD PRESSURE: 102 MMHG | HEART RATE: 65 BPM | TEMPERATURE: 97 F | BODY MASS INDEX: 32.49 KG/M2 | DIASTOLIC BLOOD PRESSURE: 62 MMHG | OXYGEN SATURATION: 99 % | WEIGHT: 207 LBS

## 2023-10-18 DIAGNOSIS — J20.9 ACUTE BRONCHITIS, UNSPECIFIED ORGANISM: ICD-10-CM

## 2023-10-18 DIAGNOSIS — H65.191 OTHER NON-RECURRENT ACUTE NONSUPPURATIVE OTITIS MEDIA OF RIGHT EAR: ICD-10-CM

## 2023-10-18 DIAGNOSIS — J01.10 ACUTE FRONTAL SINUSITIS, RECURRENCE NOT SPECIFIED: Primary | ICD-10-CM

## 2023-10-18 DIAGNOSIS — J01.00 ACUTE NON-RECURRENT MAXILLARY SINUSITIS: ICD-10-CM

## 2023-10-18 PROCEDURE — 3008F BODY MASS INDEX DOCD: CPT | Performed by: NURSE PRACTITIONER

## 2023-10-18 PROCEDURE — 99213 OFFICE O/P EST LOW 20 MIN: CPT | Performed by: NURSE PRACTITIONER

## 2023-10-18 PROCEDURE — 3074F SYST BP LT 130 MM HG: CPT | Performed by: NURSE PRACTITIONER

## 2023-10-18 PROCEDURE — 3078F DIAST BP <80 MM HG: CPT | Performed by: NURSE PRACTITIONER

## 2023-10-18 RX ORDER — FLUCONAZOLE 150 MG/1
150 TABLET ORAL
COMMUNITY
Start: 2023-09-01

## 2023-10-18 RX ORDER — ALBUTEROL SULFATE 90 UG/1
2 AEROSOL, METERED RESPIRATORY (INHALATION) EVERY 6 HOURS PRN
Qty: 1 EACH | Refills: 0 | Status: SHIPPED | OUTPATIENT
Start: 2023-10-18

## 2023-10-18 RX ORDER — AMOXICILLIN AND CLAVULANATE POTASSIUM 875; 125 MG/1; MG/1
1 TABLET, FILM COATED ORAL 2 TIMES DAILY
Qty: 20 TABLET | Refills: 0 | Status: SHIPPED | OUTPATIENT
Start: 2023-10-18 | End: 2023-10-28

## 2023-10-19 ENCOUNTER — PATIENT MESSAGE (OUTPATIENT)
Dept: FAMILY MEDICINE CLINIC | Facility: CLINIC | Age: 38
End: 2023-10-19

## 2023-10-19 RX ORDER — PREDNISONE 20 MG/1
20 TABLET ORAL 2 TIMES DAILY
Qty: 10 TABLET | Refills: 0 | Status: SHIPPED | OUTPATIENT
Start: 2023-10-19 | End: 2023-10-24

## 2023-10-19 NOTE — TELEPHONE ENCOUNTER
From: Tracy Broderick  To: Argenis Roger  Sent: 10/19/2023 6:00 AM CDT  Subject: Breathing Issues    Hi Rhoda! I just wanted to shoot you a message this morning and let you know it's been harder to breathe this morning than in previous days. I used the inhaler last night before bed and again this morning and I feel like I'm having a hard time breathing--the cough has increased as well. I know you wanted me to let you know if it didn't get better by Friday, but I figured you would also want to know that it got worse.  Thanks!   -Jocelyne [place]-

## 2023-10-19 NOTE — TELEPHONE ENCOUNTER
Patient feeling short of breath  Patient cannot get a full  deep breath - feels tight on deep inspiration  After trying to take deep breath pt starts coughing  Cough slightly productive  Denies wheezing  Patient able to speak in full sentences. Feels a little bit better since this morning  Used inhaler at 4:30 am  Denies fever  Patient currently at work.   Per verbal Yared Boer - she will send steroid if symptoms worsen patient to go to ER  Patient agreeable to plan  Please send [facility]

## 2023-10-31 ENCOUNTER — PATIENT MESSAGE (OUTPATIENT)
Dept: FAMILY MEDICINE CLINIC | Facility: CLINIC | Age: 38
End: 2023-10-31

## 2023-10-31 NOTE — TELEPHONE ENCOUNTER
From: Lior Bergeron  To: Pepito Smyth  Sent: 10/31/2023 7:47 AM CDT  Subject: Still Sick? Good Morning! I'm not sure if I have caught another bug or if the antibiotics did not fully clear up my sinus and ear infections. I was feeling better but now I'm congested again, I have post nasal drip, my throat is scratchy, I have a semi productive cough, I have a headache, and I'm having trouble breathing again. I'm just wondering if we should try another round of antibiotics and a steroid? I did end up using the inhaler this morning.  Let me know what you think!  -Linda Matthews-

## 2023-11-01 ENCOUNTER — HOSPITAL ENCOUNTER (OUTPATIENT)
Age: 38
Discharge: HOME | End: 2023-11-01
Payer: COMMERCIAL

## 2023-11-01 VITALS
RESPIRATION RATE: 18 BRPM | TEMPERATURE: 98 F | OXYGEN SATURATION: 99 % | HEIGHT: 67 IN | HEART RATE: 80 BPM | SYSTOLIC BLOOD PRESSURE: 125 MMHG | DIASTOLIC BLOOD PRESSURE: 78 MMHG | BODY MASS INDEX: 31.39 KG/M2 | WEIGHT: 200 LBS

## 2023-11-01 DIAGNOSIS — J06.9 VIRAL URI: Primary | ICD-10-CM

## 2023-11-01 LAB — SARS-COV-2 RNA RESP QL NAA+PROBE: NOT DETECTED

## 2023-11-01 PROCEDURE — U0002 COVID-19 LAB TEST NON-CDC: HCPCS | Performed by: NURSE PRACTITIONER

## 2023-11-01 PROCEDURE — 99213 OFFICE O/P EST LOW 20 MIN: CPT | Performed by: NURSE PRACTITIONER

## 2023-11-01 RX ORDER — PREDNISONE 10 MG/1
TABLET ORAL
Qty: 20 TABLET | Refills: 0 | Status: SHIPPED | OUTPATIENT
Start: 2023-11-01 | End: 2023-11-08

## 2023-11-01 NOTE — ED INITIAL ASSESSMENT (HPI)
Pt with recent antibx and steroid course for ear infection and cough cold sx. Pt just finished last med dose this weekend and feels like sx started all over again.

## 2023-11-06 ENCOUNTER — HOSPITAL ENCOUNTER (OUTPATIENT)
Dept: GENERAL RADIOLOGY | Age: 38
Discharge: HOME | End: 2023-11-06
Attending: NURSE PRACTITIONER
Payer: COMMERCIAL

## 2023-11-06 ENCOUNTER — OFFICE VISIT (OUTPATIENT)
Dept: FAMILY MEDICINE CLINIC | Facility: CLINIC | Age: 38
End: 2023-11-06
Payer: COMMERCIAL

## 2023-11-06 ENCOUNTER — TELEPHONE (OUTPATIENT)
Dept: FAMILY MEDICINE CLINIC | Facility: CLINIC | Age: 38
End: 2023-11-06

## 2023-11-06 VITALS
HEIGHT: 67 IN | HEART RATE: 90 BPM | SYSTOLIC BLOOD PRESSURE: 112 MMHG | TEMPERATURE: 97 F | DIASTOLIC BLOOD PRESSURE: 60 MMHG | OXYGEN SATURATION: 98 % | RESPIRATION RATE: 18 BRPM | WEIGHT: 208 LBS | BODY MASS INDEX: 32.65 KG/M2

## 2023-11-06 DIAGNOSIS — R07.89 CHEST TIGHTNESS: ICD-10-CM

## 2023-11-06 DIAGNOSIS — R05.1 ACUTE COUGH: ICD-10-CM

## 2023-11-06 DIAGNOSIS — R06.02 SHORTNESS OF BREATH: ICD-10-CM

## 2023-11-06 DIAGNOSIS — R05.1 ACUTE COUGH: Primary | ICD-10-CM

## 2023-11-06 PROCEDURE — 3008F BODY MASS INDEX DOCD: CPT | Performed by: NURSE PRACTITIONER

## 2023-11-06 PROCEDURE — 71046 X-RAY EXAM CHEST 2 VIEWS: CPT | Performed by: NURSE PRACTITIONER

## 2023-11-06 PROCEDURE — 3074F SYST BP LT 130 MM HG: CPT | Performed by: NURSE PRACTITIONER

## 2023-11-06 PROCEDURE — 99213 OFFICE O/P EST LOW 20 MIN: CPT | Performed by: NURSE PRACTITIONER

## 2023-11-06 PROCEDURE — 3078F DIAST BP <80 MM HG: CPT | Performed by: NURSE PRACTITIONER

## 2023-11-06 NOTE — TELEPHONE ENCOUNTER
Called patient and scheduled appt    Future Appointments   Date Time Provider Nena Jones   11/6/2023  2:40 PM OLIVER Ahumada [facility] [place]   11/9/2023  5:00 PM OLIVER Ahumada [facility] [place]

## 2023-11-06 NOTE — TELEPHONE ENCOUNTER
----- Message from OLIVER Saba sent at 11/6/2023  4:09 PM CST -----  Results reviewed.  No pneumonia or lung abnormalities noted

## 2023-11-20 ENCOUNTER — OFFICE VISIT (OUTPATIENT)
Dept: FAMILY MEDICINE CLINIC | Facility: CLINIC | Age: 38
End: 2023-11-20
Payer: COMMERCIAL

## 2023-11-20 VITALS
OXYGEN SATURATION: 99 % | SYSTOLIC BLOOD PRESSURE: 126 MMHG | DIASTOLIC BLOOD PRESSURE: 72 MMHG | WEIGHT: 209.63 LBS | RESPIRATION RATE: 18 BRPM | BODY MASS INDEX: 32.9 KG/M2 | HEIGHT: 67 IN | HEART RATE: 77 BPM

## 2023-11-20 DIAGNOSIS — L70.9 ACNE, UNSPECIFIED ACNE TYPE: ICD-10-CM

## 2023-11-20 DIAGNOSIS — Z23 NEED FOR VACCINATION: ICD-10-CM

## 2023-11-20 DIAGNOSIS — Z00.00 ANNUAL PHYSICAL EXAM: Primary | ICD-10-CM

## 2023-11-20 DIAGNOSIS — F41.9 ANXIETY: ICD-10-CM

## 2023-11-20 PROCEDURE — 99395 PREV VISIT EST AGE 18-39: CPT | Performed by: NURSE PRACTITIONER

## 2023-11-20 PROCEDURE — 3074F SYST BP LT 130 MM HG: CPT | Performed by: NURSE PRACTITIONER

## 2023-11-20 PROCEDURE — 3008F BODY MASS INDEX DOCD: CPT | Performed by: NURSE PRACTITIONER

## 2023-11-20 PROCEDURE — 3078F DIAST BP <80 MM HG: CPT | Performed by: NURSE PRACTITIONER

## 2023-12-20 ENCOUNTER — TELEPHONE (OUTPATIENT)
Dept: FAMILY MEDICINE CLINIC | Facility: CLINIC | Age: 38
End: 2023-12-20

## 2023-12-29 ENCOUNTER — LAB ENCOUNTER (OUTPATIENT)
Dept: LAB | Age: 38
End: 2023-12-29
Attending: NURSE PRACTITIONER
Payer: COMMERCIAL

## 2023-12-29 DIAGNOSIS — Z00.00 ANNUAL PHYSICAL EXAM: ICD-10-CM

## 2023-12-29 LAB
ALBUMIN SERPL-MCNC: 4.2 G/DL (ref 3.4–5)
ALBUMIN/GLOB SERPL: 1.4 {RATIO} (ref 1–2)
ALP LIVER SERPL-CCNC: 67 U/L
ALT SERPL-CCNC: 81 U/L
ANION GAP SERPL CALC-SCNC: 5 MMOL/L (ref 0–18)
AST SERPL-CCNC: 66 U/L (ref 15–37)
BASOPHILS # BLD AUTO: 0.04 X10(3) UL (ref 0–0.2)
BASOPHILS NFR BLD AUTO: 0.5 %
BILIRUB SERPL-MCNC: 0.7 MG/DL (ref 0.1–2)
BUN BLD-MCNC: 15 MG/DL (ref 9–23)
CALCIUM BLD-MCNC: 8.7 MG/DL (ref 8.5–10.1)
CHLORIDE SERPL-SCNC: 106 MMOL/L (ref 98–112)
CHOLEST SERPL-MCNC: 194 MG/DL (ref ?–200)
CO2 SERPL-SCNC: 25 MMOL/L (ref 21–32)
CREAT BLD-MCNC: 0.68 MG/DL
EGFRCR SERPLBLD CKD-EPI 2021: 116 ML/MIN/1.73M2 (ref 60–?)
EOSINOPHIL # BLD AUTO: 0.03 X10(3) UL (ref 0–0.7)
EOSINOPHIL NFR BLD AUTO: 0.4 %
ERYTHROCYTE [DISTWIDTH] IN BLOOD BY AUTOMATED COUNT: 12 %
FASTING PATIENT LIPID ANSWER: YES
FASTING STATUS PATIENT QL REPORTED: YES
GLOBULIN PLAS-MCNC: 2.9 G/DL (ref 2.8–4.4)
GLUCOSE BLD-MCNC: 90 MG/DL (ref 70–99)
HCT VFR BLD AUTO: 42.1 %
HDLC SERPL-MCNC: 92 MG/DL (ref 40–59)
HGB BLD-MCNC: 14.7 G/DL
IMM GRANULOCYTES # BLD AUTO: 0.04 X10(3) UL (ref 0–1)
IMM GRANULOCYTES NFR BLD: 0.5 %
LDLC SERPL CALC-MCNC: 91 MG/DL (ref ?–100)
LYMPHOCYTES # BLD AUTO: 1.59 X10(3) UL (ref 1–4)
LYMPHOCYTES NFR BLD AUTO: 19.9 %
MCH RBC QN AUTO: 33.3 PG (ref 26–34)
MCHC RBC AUTO-ENTMCNC: 34.9 G/DL (ref 31–37)
MCV RBC AUTO: 95.2 FL
MONOCYTES # BLD AUTO: 0.55 X10(3) UL (ref 0.1–1)
MONOCYTES NFR BLD AUTO: 6.9 %
NEUTROPHILS # BLD AUTO: 5.75 X10 (3) UL (ref 1.5–7.7)
NEUTROPHILS # BLD AUTO: 5.75 X10(3) UL (ref 1.5–7.7)
NEUTROPHILS NFR BLD AUTO: 71.8 %
NONHDLC SERPL-MCNC: 102 MG/DL (ref ?–130)
OSMOLALITY SERPL CALC.SUM OF ELEC: 282 MOSM/KG (ref 275–295)
PLATELET # BLD AUTO: 199 10(3)UL (ref 150–450)
POTASSIUM SERPL-SCNC: 4.1 MMOL/L (ref 3.5–5.1)
PROT SERPL-MCNC: 7.1 G/DL (ref 6.4–8.2)
RBC # BLD AUTO: 4.42 X10(6)UL
SODIUM SERPL-SCNC: 136 MMOL/L (ref 136–145)
TRIGL SERPL-MCNC: 56 MG/DL (ref 30–149)
TSI SER-ACNC: 1.26 MIU/ML (ref 0.36–3.74)
VLDLC SERPL CALC-MCNC: 9 MG/DL (ref 0–30)
WBC # BLD AUTO: 8 X10(3) UL (ref 4–11)

## 2023-12-29 PROCEDURE — 85025 COMPLETE CBC W/AUTO DIFF WBC: CPT | Performed by: NURSE PRACTITIONER

## 2024-01-02 ENCOUNTER — TELEPHONE (OUTPATIENT)
Dept: FAMILY MEDICINE CLINIC | Facility: CLINIC | Age: 39
End: 2024-01-02

## 2024-01-02 ENCOUNTER — PATIENT MESSAGE (OUTPATIENT)
Dept: FAMILY MEDICINE CLINIC | Facility: CLINIC | Age: 39
End: 2024-01-02

## 2024-01-02 DIAGNOSIS — R79.89 ELEVATED LFTS: Primary | ICD-10-CM

## 2024-01-02 NOTE — TELEPHONE ENCOUNTER
From: Samanta Mcginnis  To: Lola Oh  Sent: 1/2/2024 9:29 AM CST  Subject: Blood Test Results    Hi!  I forgot to mention on the phone that I've also been donating plasma. Is that something I should also avoid before my re-check in 6 weeks?  -Samanta Mcginnis-

## 2024-01-02 NOTE — TELEPHONE ENCOUNTER
----- Message from Codi Carver MD sent at 1/1/2024  7:09 AM CST -----  Results reviewed. Please inform patient labs shows normal blood count and thyroid and lipid panel.  Lft elevated can be fatty liver recommend low fat diet and exercise avoid tylenol and alcohol repeat lft in 6 wks. Can you please place order for lft in6 wk.

## 2024-01-02 NOTE — TELEPHONE ENCOUNTER
Ok to donate plasma.  Avoid alcohol and tylenol before bw.  Also would recommend exercise 30mins 3-5 times a day and low fat diet.

## 2024-02-10 ENCOUNTER — PATIENT MESSAGE (OUTPATIENT)
Dept: FAMILY MEDICINE CLINIC | Facility: CLINIC | Age: 39
End: 2024-02-10

## 2024-02-12 NOTE — TELEPHONE ENCOUNTER
From: Samanta Mcginnis  To: Lola Oh  Sent: 2/10/2024 1:06 PM CST  Subject: Blood Test Follow Up    Hello!  It's been 6 weeks since I had blood work done and had a high AST (SGOT) result. I believe someone was supposed to reach out to schedule a retest for me so I'm just reaching out to request a phone call to get that scheduled.     I've also been having frequent headaches, some pain in the middle and lower right side of my back, and fatigue. I'm not sure if the high AST test is linked to all that or not but my aunt  from colangiocarcinoma so I can't help but think it could be related.    Thanks!    -Samanta Mcginnis-

## 2024-02-13 ENCOUNTER — LAB ENCOUNTER (OUTPATIENT)
Dept: LAB | Age: 39
End: 2024-02-13
Attending: FAMILY MEDICINE
Payer: COMMERCIAL

## 2024-02-13 DIAGNOSIS — R79.89 ELEVATED LFTS: ICD-10-CM

## 2024-02-13 LAB
ALBUMIN SERPL-MCNC: 3.8 G/DL (ref 3.4–5)
ALP LIVER SERPL-CCNC: 40 U/L
ALT SERPL-CCNC: 30 U/L
AST SERPL-CCNC: 25 U/L (ref 15–37)
BILIRUB DIRECT SERPL-MCNC: 0.1 MG/DL (ref 0–0.2)
BILIRUB SERPL-MCNC: 0.4 MG/DL (ref 0.1–2)
PROT SERPL-MCNC: 6.6 G/DL (ref 6.4–8.2)

## 2024-02-13 PROCEDURE — 80076 HEPATIC FUNCTION PANEL: CPT

## 2024-02-14 ENCOUNTER — TELEPHONE (OUTPATIENT)
Dept: FAMILY MEDICINE CLINIC | Facility: CLINIC | Age: 39
End: 2024-02-14

## 2024-02-14 NOTE — TELEPHONE ENCOUNTER
----- Message from OLIVER Dozier sent at 2/14/2024  9:46 AM CST -----  Liver enzymes returned to normal

## 2024-07-24 ENCOUNTER — HOSPITAL ENCOUNTER (OUTPATIENT)
Age: 39
Discharge: HOME | End: 2024-07-24
Payer: COMMERCIAL

## 2024-07-24 VITALS
RESPIRATION RATE: 18 BRPM | DIASTOLIC BLOOD PRESSURE: 76 MMHG | SYSTOLIC BLOOD PRESSURE: 122 MMHG | OXYGEN SATURATION: 99 % | TEMPERATURE: 99 F | HEART RATE: 73 BPM

## 2024-07-24 DIAGNOSIS — H60.502 ACUTE OTITIS EXTERNA OF LEFT EAR, UNSPECIFIED TYPE: Primary | ICD-10-CM

## 2024-07-24 PROCEDURE — 99213 OFFICE O/P EST LOW 20 MIN: CPT

## 2024-07-24 RX ORDER — CIPROFLOXACIN AND DEXAMETHASONE 3; 1 MG/ML; MG/ML
4 SUSPENSION/ DROPS AURICULAR (OTIC) 2 TIMES DAILY
Qty: 1 EACH | Refills: 0 | Status: SHIPPED | OUTPATIENT
Start: 2024-07-24 | End: 2024-07-31

## 2024-07-24 NOTE — ED PROVIDER NOTES
Patient Seen in: [facility]      History     Chief Complaint   Patient presents with    Ear Pain     Stated Complaint: swimmers ear in left ear    Subjective:   HPI    38-year-old female presents today with complaints of left-sided ear pain and swelling.  Patient states the last few days she has had minor left-sided earache and then today she woke up and noticed that her left ear was slightly swollen.  Patient states that she was swimming on .  Patient denies any hearing changes associated with the earache.  Patient states she was using alcohol drops and placing them in her left ear with no relief.    Objective:   Past Medical History:    Abdominal distention    Abdominal pain    Abnormal Pap smear of cervix    during , ? follow up nl    Allergic rhinitis    Asthma (HCC)    Inhaler last over a year ago  exercise  induced    Belching    Bloating    Body piercing    Constipation    Decorative tattoo    Easy bruising    Fatigue    Flatulence/gas pain/belching    Frequent urination    Frequent use of laxatives    History of depression    Irregular bowel habits    Leaking of urine    Obesity    Pain in joints    Sleep disturbance    Stress    Wears glasses              Past Surgical History:   Procedure Laterality Date    Dental surgery procedure      wisdom teeth      4/15/16 & 19    Other  2017    rectal vaginal fistula repair    Other surgical history  2017    Rectovaginal fistula repair                Social History     Socioeconomic History    Marital status:    Tobacco Use    Smoking status: Never    Smokeless tobacco: Never   Vaping Use    Vaping status: Never Used   Substance and Sexual Activity    Alcohol use: Yes     Alcohol/week: 1.0 standard drink of alcohol     Types: 1 Standard drinks or equivalent per week     Comment: occasionally    Drug use: Never    Sexual activity: Yes     Partners: Male   Other Topics Concern    Caffeine Concern No    Exercise  Yes    Seat Belt Yes    Special Diet No    Stress Concern Yes    Weight Concern No              Review of Systems   Constitutional: Negative.    HENT:  Positive for congestion and ear pain.    Eyes: Negative.    Respiratory: Negative.     Cardiovascular: Negative.    Gastrointestinal: Negative.    Endocrine: Negative.    Genitourinary: Negative.    Musculoskeletal: Negative.    Skin: Negative.    Allergic/Immunologic: Negative.    Neurological: Negative.    Hematological: Negative.    Psychiatric/Behavioral: Negative.         Positive for stated Chief Complaint: Ear Pain    Other systems are as noted in HPI.  Constitutional and vital signs reviewed.      All other systems reviewed and negative except as noted above.    Physical Exam     ED Triage Vitals [07/24/24 0852]   /76   Pulse 73   Resp 18   Temp 98.7 °F (37.1 °C)   Temp src Temporal   SpO2 99 %   O2 Device None (Room air)       Current Vitals:   Vital Signs  BP: 122/76  Pulse: 73  Resp: 18  Temp: 98.7 °F (37.1 °C)  Temp src: Temporal    Oxygen Therapy  SpO2: 99 %  O2 Device: None (Room air)            Physical Exam  Vitals and nursing note reviewed.   Constitutional:       Appearance: Normal appearance. She is normal weight.   HENT:      Head: Normocephalic.      Right Ear: Tympanic membrane, ear canal and external ear normal.      Left Ear: Tympanic membrane and external ear normal.      Ears:      Comments: Left TM is intact not bulging.  Left canal slightly red and swollen with clear discharge present.  Tender to manipulation of tragus.     Nose: Nose normal.      Mouth/Throat:      Mouth: Mucous membranes are moist.      Pharynx: Oropharynx is clear.   Eyes:      Extraocular Movements: Extraocular movements intact.      Conjunctiva/sclera: Conjunctivae normal.      Pupils: Pupils are equal, round, and reactive to light.   Pulmonary:      Effort: Pulmonary effort is normal.   Musculoskeletal:      Cervical back: Normal range of motion and neck  supple.   Skin:     General: Skin is warm.      Capillary Refill: Capillary refill takes less than 2 seconds.   Neurological:      Mental Status: She is alert.   Psychiatric:         Mood and Affect: Mood normal.             ED Course   Labs Reviewed - No data to display                   MDM      38-year-old female presents today with complaints of left-sided ear pain and swelling.  Patient states the last few days she has had minor left-sided earache and then today she woke up and noticed that her left ear was slightly swollen.  Patient states that she was swimming on Sunday.  Patient denies any hearing changes associated with the earache.  Patient states she was using alcohol drops and placing them in her left ear with no relief.  Vital signs: Please see EMR.  Physical exam: Please see exam.  Differential diagnosis: Otitis externa, otitis media, otalgia.  Based on physical exam and HPI will diagnosed with otitis externa and treat with Ciprodex otic drops.  Patient instructed to refrain from getting water in her left ear or using earbuds in the ear for the next 2 to 3 days.  ED precautions given.                                   Medical Decision Making  38-year-old female presents today with complaints of left-sided ear pain and swelling.  Patient states the last few days she has had minor left-sided earache and then today she woke up and noticed that her left ear was slightly swollen.  Patient states that she was swimming on Sunday.  Patient denies any hearing changes associated with the earache.  Patient states she was using alcohol drops and placing them in her left ear with no relief.    Problems Addressed:  Acute otitis externa of left ear, unspecified type: acute illness or injury    Risk  Prescription drug management.        Disposition and Plan     Clinical Impression:  1. Acute otitis externa of left ear, unspecified type         Disposition:  Discharge  7/24/2024  8:58 am    Follow-up:  Codi Carver  MD  [address] [address]  [phone]    Call in 1 week            Medications Prescribed:  Current Discharge Medication List        START taking these medications    Details   ciprofloxacin-dexamethasone (CIPRODEX) 0.3-0.1 % Otic Suspension Place 4 drops into the left ear 2 (two) times daily for 7 days.  Qty: 1 each, Refills: 0

## 2025-03-16 ENCOUNTER — HOSPITAL ENCOUNTER (OUTPATIENT)
Age: 40
Discharge: HOME | End: 2025-03-16
Payer: COMMERCIAL

## 2025-03-16 VITALS
RESPIRATION RATE: 18 BRPM | HEART RATE: 89 BPM | TEMPERATURE: 98 F | HEIGHT: 67 IN | WEIGHT: 230 LBS | OXYGEN SATURATION: 98 % | SYSTOLIC BLOOD PRESSURE: 118 MMHG | DIASTOLIC BLOOD PRESSURE: 80 MMHG | BODY MASS INDEX: 36.1 KG/M2

## 2025-03-16 DIAGNOSIS — J06.9 VIRAL URI WITH COUGH: Primary | ICD-10-CM

## 2025-03-16 DIAGNOSIS — J45.21 MILD INTERMITTENT ASTHMA WITH EXACERBATION (HCC): ICD-10-CM

## 2025-03-16 LAB
POCT INFLUENZA A: NEGATIVE
POCT INFLUENZA B: NEGATIVE
S PYO AG THROAT QL: NEGATIVE
SARS-COV-2 RNA RESP QL NAA+PROBE: NOT DETECTED

## 2025-03-16 RX ORDER — PREDNISONE 20 MG/1
40 TABLET ORAL DAILY
Qty: 8 TABLET | Refills: 0 | Status: SHIPPED | OUTPATIENT
Start: 2025-03-16 | End: 2025-03-20

## 2025-03-16 RX ORDER — PREDNISONE 20 MG/1
60 TABLET ORAL ONCE
Status: COMPLETED | OUTPATIENT
Start: 2025-03-16 | End: 2025-03-16

## 2025-03-16 NOTE — ED PROVIDER NOTES
Patient Seen in: [facility]      History     Chief Complaint   Patient presents with    Sore Throat     Sore throat and congestion since Friday - Entered by patient    Nasal Congestion     Stated Complaint: Sore Throat - Sore throat and congestion since Friday    Subjective:   The history is provided by the patient.     Patient is a pleasant 39-year-old female with asthma here for evaluation of nasal congestion, sore throat and cough.  Cough is slightly productive with thick mucus.  Denies shortness of breath or wheezing.  Denies fevers, chills or bodyaches.  Symptoms started Friday, 2 days ago.  Sore throat has worsened since onset.  Has taken ibuprofen for sore throat, not taken anything today.    Patient is a teacher    Objective:     Past Medical History:    Abdominal distention    Abdominal pain    Abnormal Pap smear of cervix    during college, ? follow up nl    Allergic rhinitis    Asthma (HCC)    Inhaler last over a year ago  exercise  induced    Belching    Bloating    Body piercing    Constipation    Decorative tattoo    Easy bruising    Fatigue    Flatulence/gas pain/belching    Frequent urination    Frequent use of laxatives    History of depression    Irregular bowel habits    Leaking of urine    Obesity    Pain in joints    Sleep disturbance    Stress    Wears glasses              Past Surgical History:   Procedure Laterality Date    Dental surgery procedure      wisdom teeth      4/15/16 & 19    Other  2017    rectal vaginal fistula repair    Other surgical history  2017    Rectovaginal fistula repair                Social History     Socioeconomic History    Marital status:    Tobacco Use    Smoking status: Never    Smokeless tobacco: Never   Vaping Use    Vaping status: Never Used   Substance and Sexual Activity    Alcohol use: Yes     Alcohol/week: 1.0 standard drink of alcohol     Types: 1 Standard drinks or equivalent per week     Comment: occasionally    Drug  use: Never    Sexual activity: Yes     Partners: Male   Other Topics Concern    Caffeine Concern No    Exercise Yes    Seat Belt Yes    Special Diet No    Stress Concern Yes    Weight Concern No              Review of Systems    Positive for stated complaint: Sore Throat - Sore throat and congestion since Friday  Other systems are as noted in HPI.  Constitutional and vital signs reviewed.      All other systems reviewed and negative except as noted above.    Physical Exam     ED Triage Vitals [03/16/25 0828]   /80   Pulse 89   Resp 18   Temp 97.9 °F (36.6 °C)   Temp src Oral   SpO2 98 %   O2 Device None (Room air)       Current Vitals:   Vital Signs  BP: 118/80  Pulse: 89  Resp: 18  Temp: 97.9 °F (36.6 °C)  Temp src: Oral    Oxygen Therapy  SpO2: 98 %  O2 Device: None (Room air)        Physical Exam  Vitals and nursing note reviewed.   Constitutional:       General: She is not in acute distress.     Appearance: She is well-developed. She is not ill-appearing, toxic-appearing or diaphoretic.   HENT:      Right Ear: Tympanic membrane and ear canal normal.      Left Ear: Tympanic membrane and ear canal normal.      Nose: Congestion present.      Mouth/Throat:      Mouth: Mucous membranes are moist.      Pharynx: Uvula midline. Posterior oropharyngeal erythema present. No oropharyngeal exudate.      Tonsils: No tonsillar exudate or tonsillar abscesses. 2+ on the right. 2+ on the left.   Eyes:      Conjunctiva/sclera: Conjunctivae normal.      Pupils: Pupils are equal, round, and reactive to light.   Cardiovascular:      Rate and Rhythm: Normal rate and regular rhythm.      Heart sounds: Normal heart sounds.   Pulmonary:      Effort: Pulmonary effort is normal.      Breath sounds: Normal breath sounds.   Neurological:      Mental Status: She is alert.           ED Course     Labs Reviewed   POCT RAPID STREP - Normal   POCT FLU TEST - Normal    Narrative:     This assay is a rapid molecular in vitro test utilizing  nucleic acid amplification of influenza A and B viral RNA.   RAPID SARS-COV-2 BY PCR - Normal          MDM            Medical Decision Making  Differentials include but are not limited to viral URI versus strep.  Negative rapid strep testing here today.  There is no obvious bacterial focus noted on exam.  Negative rapid COVID and flu testing.  Plan for supportive treatment including rest, fluids, over-the-counter medications for symptom management.  Quarantine guidelines, monitoring parameters and follow-up precautions reviewed with patient who agree with plan of care.  All questions answered patient satisfaction    Amount and/or Complexity of Data Reviewed  Independent Historian: parent  Labs: ordered. Decision-making details documented in ED Course.        Disposition and Plan     Clinical Impression:  1. Viral URI with cough    2. Mild intermittent asthma with exacerbation (HCC)         Disposition:  Discharge  3/16/2025  9:19 am    Follow-up:  Codi Carver MD  [address] [address]  [phone]      As needed          Medications Prescribed:  Discharge Medication List as of 3/16/2025  9:22 AM        START taking these medications    Details   predniSONE 20 MG Oral Tab Take 2 tablets (40 mg total) by mouth daily for 4 days., Normal, Disp-8 tablet, R-0                 Supplementary Documentation:

## 2025-05-01 ENCOUNTER — PATIENT MESSAGE (OUTPATIENT)
Dept: FAMILY MEDICINE CLINIC | Facility: CLINIC | Age: 40
End: 2025-05-01

## 2025-05-01 NOTE — TELEPHONE ENCOUNTER
Looks like patient has appointment with [facility] outpatient lab for EKG  Order for EKG was placed by Terri Dupont  Last visit with our office was 11/20/23    Future Appointments   Date Time Provider Department Center   5/3/2025  1:00 PM [facility]   6/5/2025  9:30 AM Terri Bell, [facility]

## 2025-05-03 ENCOUNTER — EKG ENCOUNTER (OUTPATIENT)
Dept: LAB | Age: 40
End: 2025-05-03
Payer: COMMERCIAL

## 2025-05-03 DIAGNOSIS — Z79.899 MEDICATION MANAGEMENT: ICD-10-CM

## 2025-05-03 LAB
ATRIAL RATE: 72 BPM
P AXIS: 30 DEGREES
P-R INTERVAL: 136 MS
Q-T INTERVAL: 398 MS
QRS DURATION: 106 MS
QTC CALCULATION (BEZET): 435 MS
R AXIS: 26 DEGREES
T AXIS: 56 DEGREES
VENTRICULAR RATE: 72 BPM

## 2025-05-03 PROCEDURE — 93005 ELECTROCARDIOGRAM TRACING: CPT

## 2025-05-03 PROCEDURE — 93010 ELECTROCARDIOGRAM REPORT: CPT | Performed by: STUDENT IN AN ORGANIZED HEALTH CARE EDUCATION/TRAINING PROGRAM

## 2025-05-09 ENCOUNTER — PATIENT MESSAGE (OUTPATIENT)
Dept: FAMILY MEDICINE CLINIC | Facility: CLINIC | Age: 40
End: 2025-05-09

## 2025-05-09 ENCOUNTER — LAB ENCOUNTER (OUTPATIENT)
Dept: LAB | Age: 40
End: 2025-05-09
Attending: NURSE PRACTITIONER
Payer: COMMERCIAL

## 2025-05-09 ENCOUNTER — OFFICE VISIT (OUTPATIENT)
Dept: FAMILY MEDICINE CLINIC | Facility: CLINIC | Age: 40
End: 2025-05-09
Payer: COMMERCIAL

## 2025-05-09 VITALS
HEART RATE: 76 BPM | OXYGEN SATURATION: 100 % | WEIGHT: 244 LBS | HEIGHT: 67 IN | SYSTOLIC BLOOD PRESSURE: 124 MMHG | DIASTOLIC BLOOD PRESSURE: 78 MMHG | BODY MASS INDEX: 38.3 KG/M2 | TEMPERATURE: 98 F

## 2025-05-09 DIAGNOSIS — Z82.49 FAMILY HISTORY OF CARDIOVASCULAR DISORDER: ICD-10-CM

## 2025-05-09 DIAGNOSIS — N92.6 LATE MENSES: ICD-10-CM

## 2025-05-09 DIAGNOSIS — K59.09 OTHER CONSTIPATION: ICD-10-CM

## 2025-05-09 DIAGNOSIS — F33.0 MILD EPISODE OF RECURRENT MAJOR DEPRESSIVE DISORDER: ICD-10-CM

## 2025-05-09 DIAGNOSIS — Z00.00 GENERAL MEDICAL EXAM: Primary | ICD-10-CM

## 2025-05-09 DIAGNOSIS — E66.09 CLASS 2 OBESITY DUE TO EXCESS CALORIES WITHOUT SERIOUS COMORBIDITY WITH BODY MASS INDEX (BMI) OF 38.0 TO 38.9 IN ADULT: ICD-10-CM

## 2025-05-09 DIAGNOSIS — Z13.6 SCREENING FOR CARDIOVASCULAR CONDITION: ICD-10-CM

## 2025-05-09 DIAGNOSIS — Z83.3 FAMILY HISTORY OF DIABETES MELLITUS: ICD-10-CM

## 2025-05-09 DIAGNOSIS — E66.812 CLASS 2 OBESITY DUE TO EXCESS CALORIES WITHOUT SERIOUS COMORBIDITY WITH BODY MASS INDEX (BMI) OF 38.0 TO 38.9 IN ADULT: ICD-10-CM

## 2025-05-09 DIAGNOSIS — F41.9 ANXIETY: ICD-10-CM

## 2025-05-09 DIAGNOSIS — Z00.00 GENERAL MEDICAL EXAM: ICD-10-CM

## 2025-05-09 DIAGNOSIS — J45.20 MILD INTERMITTENT ASTHMA WITHOUT COMPLICATION (HCC): ICD-10-CM

## 2025-05-09 DIAGNOSIS — F50.819 BINGE EATING DISORDER, UNSPECIFIED SEVERITY: ICD-10-CM

## 2025-05-09 LAB
ALBUMIN SERPL-MCNC: 5 G/DL (ref 3.2–4.8)
ALBUMIN/GLOB SERPL: 2.2 {RATIO} (ref 1–2)
ALP LIVER SERPL-CCNC: 75 U/L (ref 37–98)
ALT SERPL-CCNC: 29 U/L (ref 10–49)
ANION GAP SERPL CALC-SCNC: 8 MMOL/L (ref 0–18)
AST SERPL-CCNC: 35 U/L (ref ?–34)
BASOPHILS # BLD AUTO: 0.05 X10(3) UL (ref 0–0.2)
BASOPHILS NFR BLD AUTO: 0.7 %
BILIRUB SERPL-MCNC: 0.6 MG/DL (ref 0.3–1.2)
BUN BLD-MCNC: 11 MG/DL (ref 9–23)
CALCIUM BLD-MCNC: 9.4 MG/DL (ref 8.7–10.6)
CHLORIDE SERPL-SCNC: 106 MMOL/L (ref 98–112)
CHOLEST SERPL-MCNC: 160 MG/DL (ref ?–200)
CO2 SERPL-SCNC: 25 MMOL/L (ref 21–32)
CONTROL LINE PRESENT WITH A CLEAR BACKGROUND (YES/NO): YES YES/NO
CREAT BLD-MCNC: 0.8 MG/DL (ref 0.55–1.02)
EGFRCR SERPLBLD CKD-EPI 2021: 97 ML/MIN/1.73M2 (ref 60–?)
EOSINOPHIL # BLD AUTO: 0.03 X10(3) UL (ref 0–0.7)
EOSINOPHIL NFR BLD AUTO: 0.4 %
ERYTHROCYTE [DISTWIDTH] IN BLOOD BY AUTOMATED COUNT: 12.5 %
EST. AVERAGE GLUCOSE BLD GHB EST-MCNC: 97 MG/DL (ref 68–126)
FASTING PATIENT LIPID ANSWER: NO
FASTING STATUS PATIENT QL REPORTED: NO
GLOBULIN PLAS-MCNC: 2.3 G/DL (ref 2–3.5)
GLUCOSE BLD-MCNC: 70 MG/DL (ref 70–99)
HBA1C MFR BLD: 5 % (ref ?–5.7)
HCT VFR BLD AUTO: 41.8 % (ref 35–48)
HDLC SERPL-MCNC: 61 MG/DL (ref 40–59)
HGB BLD-MCNC: 14.5 G/DL (ref 12–16)
IMM GRANULOCYTES # BLD AUTO: 0.01 X10(3) UL (ref 0–1)
IMM GRANULOCYTES NFR BLD: 0.1 %
KIT LOT #: NORMAL NUMERIC
LDLC SERPL CALC-MCNC: 87 MG/DL (ref ?–100)
LYMPHOCYTES # BLD AUTO: 1.63 X10(3) UL (ref 1–4)
LYMPHOCYTES NFR BLD AUTO: 22.8 %
MCH RBC QN AUTO: 32.3 PG (ref 26–34)
MCHC RBC AUTO-ENTMCNC: 34.7 G/DL (ref 31–37)
MCV RBC AUTO: 93.1 FL (ref 80–100)
MONOCYTES # BLD AUTO: 0.55 X10(3) UL (ref 0.1–1)
MONOCYTES NFR BLD AUTO: 7.7 %
NEUTROPHILS # BLD AUTO: 4.88 X10 (3) UL (ref 1.5–7.7)
NEUTROPHILS # BLD AUTO: 4.88 X10(3) UL (ref 1.5–7.7)
NEUTROPHILS NFR BLD AUTO: 68.3 %
NONHDLC SERPL-MCNC: 99 MG/DL (ref ?–130)
OSMOLALITY SERPL CALC.SUM OF ELEC: 286 MOSM/KG (ref 275–295)
PLATELET # BLD AUTO: 215 10(3)UL (ref 150–450)
POTASSIUM SERPL-SCNC: 4.2 MMOL/L (ref 3.5–5.1)
PREGNANCY TEST, URINE: NEGATIVE
PROT SERPL-MCNC: 7.3 G/DL (ref 5.7–8.2)
RBC # BLD AUTO: 4.49 X10(6)UL (ref 3.8–5.3)
SODIUM SERPL-SCNC: 139 MMOL/L (ref 136–145)
TRIGL SERPL-MCNC: 60 MG/DL (ref 30–149)
TSI SER-ACNC: 1.28 UIU/ML (ref 0.55–4.78)
VIT D+METAB SERPL-MCNC: 27.6 NG/ML (ref 30–100)
VLDLC SERPL CALC-MCNC: 10 MG/DL (ref 0–30)
WBC # BLD AUTO: 7.2 X10(3) UL (ref 4–11)

## 2025-05-09 PROCEDURE — 80050 GENERAL HEALTH PANEL: CPT | Performed by: NURSE PRACTITIONER

## 2025-05-09 PROCEDURE — 82306 VITAMIN D 25 HYDROXY: CPT | Performed by: NURSE PRACTITIONER

## 2025-05-09 PROCEDURE — 80061 LIPID PANEL: CPT | Performed by: NURSE PRACTITIONER

## 2025-05-09 PROCEDURE — 83036 HEMOGLOBIN GLYCOSYLATED A1C: CPT | Performed by: NURSE PRACTITIONER

## 2025-05-09 NOTE — PATIENT INSTRUCTIONS
Ozempic, Trulicity, Saxenda, Victoza, Mounjaro, Wegovy, and Zepbound are in a class of injectable medications called GLP-1s. There is also an oral formulation Rybelsus.     Ozempic and Mounjaro were created first, and were FDA approved and indicated for use in type 2 diabetes. It was discovered that a mostly-desired effect was also weight loss. So these medications were being prescribed off-label for weight loss in patients without diabetes.     This created many supply issues for patients that needed the medications for diabetes.     Then the company that makes Ozempic developed Wegovy. It is the same active ingredient, but the FDA approved Wegovy for weight loss.     The company that makes Mounjaro, then developed Zepbound and Zepbound received FDA approval for weight loss.     Because of these changes, Ozempic and Mounjaro are very hard to get covered off-label in patients without type 2 diabetes and we will not do prior authorizations for them when the drug is being ordered for weight loss    When we are starting any of these medications, we do require monthly visits while we are adjusting the dose. Some can be done virtually.       At your doctor's office, we do not know what medications are covered by what insurance. We do need the patient's help in finding that out.     You need to call your insurance and ask them if you have coverage for anti-obesity medications. If yes, ask which ones they prefer. They will have a list of covered medications, called their formulary, that will list what they require for coverage.     A drug will either be EXCLUDED, COVERED, or COVERED, but requiring extra steps.     If EXCLUDED - your insurance does not cover this drug under any circumstances and no explanation from the provider will be able to get it covered. No appeal process will be completed if the medication is excluded.     If COVERED - your pharmacy will be able to provider you the medication with no extra steps  needed. You will be responsible for deductibles or drug co-pays. Your pharmacy is the only one that can advise on this; provider offices do not know costs pertaining to your plan.     If COVERED, but requiring extra steps - this means either a prior authorization is required, you are required to do step therapy, or there may be quantity limitations on the medication.     Your insurance has all of this information for every drug. Many plans allow you to sign on to their website to access your formulary as well, so that may be an option for you.     Please contact your insurance to find out what is covered for you and let us know so that we can determine the best medication treatment option for you.

## 2025-05-09 NOTE — PROGRESS NOTES
HPI:   Patient is here for physical.    Concerns:   Needs clearance to start Vyvanse. Following with psych NP for anxiety, depression and binge eating disorder. They ordered EKG and would like our approval to start stimulant medication. She has been seeing a counselor for over a year. Feels her anxiety and depression have definitely improved with current medications.  Weight issues. Working with counselor on disordered eating. Tracking her nutrition is triggering so has not been doing this. Would like our opinion on Vyvanse vs GLP-1. Is interested. Denies personal or family history of thyroid cancer. Denies personal history of pancreatitis      LMP: 4/9/2025  Menstrual Cycle Length: normally regular  PMS: PMDD  Contraception: condoms  Last Pap: 8/2023    Wt Readings from Last 6 Encounters:   05/09/25 244 lb (110.7 kg)   03/16/25 230 lb (104.3 kg)   06/13/24 208 lb 11.2 oz (94.7 kg)   11/20/23 209 lb 9.6 oz (95.1 kg)   11/06/23 208 lb (94.3 kg)   11/01/23 200 lb (90.7 kg)     Body mass index is 38.22 kg/m².     Cholesterol, Total (mg/dL)   Date Value   12/29/2023 194   12/29/2022 146   07/08/2019 258 (H)     HDL Cholesterol (mg/dL)   Date Value   12/29/2023 92 (H)   12/29/2022 65 (H)   07/08/2019 70 (H)     LDL Cholesterol (mg/dL)   Date Value   12/29/2023 91   12/29/2022 70   07/08/2019 129 (H)     AST (U/L)   Date Value   02/13/2024 25   12/29/2023 66 (H)   12/29/2022 31     ALT (U/L)   Date Value   02/13/2024 30   12/29/2023 81 (H)   12/29/2022 35        Current Medications[1]   Past Medical History[2]   Past Surgical History[3]   Family History[4]   Social History:   Short Social Hx on File[5]     REVIEW OF SYSTEMS:   Review of Systems   Constitutional:  Positive for unexpected weight change. Negative for appetite change, chills, fatigue and fever.   HENT:  Positive for rhinorrhea (allergies). Negative for congestion, ear pain, postnasal drip, sore throat and trouble swallowing.    Eyes:  Negative for visual  disturbance.   Respiratory:  Negative for cough, shortness of breath and wheezing.         Only has asthma symptoms when she gets sick   Cardiovascular:  Negative for chest pain, palpitations and leg swelling.   Gastrointestinal:  Positive for constipation (has improved since she stopped drinking alcohol about a month ago). Negative for abdominal pain, blood in stool, diarrhea, nausea and vomiting.   Endocrine: Negative for cold intolerance, heat intolerance, polydipsia, polyphagia and polyuria.   Genitourinary:  Positive for menstrual problem (period is 3 days late which is unusual). Negative for dysuria, frequency, hematuria, pelvic pain, vaginal bleeding, vaginal discharge and vaginal pain.   Musculoskeletal:  Negative for back pain.   Skin:  Negative for rash.   Neurological:  Negative for headaches.   Hematological:  Does not bruise/bleed easily.   Psychiatric/Behavioral:  Positive for dysphoric mood. Negative for decreased concentration, sleep disturbance and suicidal ideas. The patient is nervous/anxious.        EXAM:   /78   Pulse 76   Temp 97.5 °F (36.4 °C)   Ht 5' 7\" (1.702 m)   Wt 244 lb (110.7 kg)   LMP 04/09/2025 (Exact Date)   SpO2 100%   BMI 38.22 kg/m²   Ideal body weight: 61.6 kg (135 lb 12.9 oz)  Adjusted ideal body weight: 81.2 kg (179 lb 1.3 oz)   Physical Exam  Constitutional:       General: She is not in acute distress.     Appearance: She is well-developed and well-groomed. She is obese. She is not ill-appearing.   HENT:      Right Ear: Tympanic membrane, ear canal and external ear normal.      Left Ear: Tympanic membrane, ear canal and external ear normal.      Nose: Nose normal.      Mouth/Throat:      Mouth: Mucous membranes are moist.      Pharynx: Oropharynx is clear.   Eyes:      Conjunctiva/sclera: Conjunctivae normal.      Pupils: Pupils are equal, round, and reactive to light.   Neck:      Thyroid: No thyromegaly.   Cardiovascular:      Rate and Rhythm: Normal rate and  regular rhythm.      Heart sounds: Normal heart sounds. No murmur heard.  Pulmonary:      Effort: Pulmonary effort is normal.      Breath sounds: Normal breath sounds.   Abdominal:      General: Bowel sounds are normal.      Palpations: Abdomen is soft.      Tenderness: There is no abdominal tenderness.   Musculoskeletal:         General: Normal range of motion.      Cervical back: Normal range of motion.   Skin:     General: Skin is warm and dry.   Neurological:      General: No focal deficit present.      Mental Status: She is alert and oriented to person, place, and time.   Psychiatric:         Mood and Affect: Mood normal.         Behavior: Behavior normal.         ASSESSMENT AND PLAN:   Diagnoses and all orders for this visit:    General medical exam  -     Hemoglobin A1C; Future  -     TSH W Reflex To Free T4; Future  -     Comp Metabolic Panel (14); Future  -     Lipid Panel; Future  -     CBC With Differential With Platelet; Future    Binge eating disorder, unspecified severity  Comments:  reviewed EKG. No contraindications to starting stimulant medication  Orders:  -     Vitamin D [E]; Future    Mild episode of recurrent major depressive disorder  -     Vitamin D [E]; Future    Anxiety  -     Vitamin D [E]; Future    Class 2 obesity due to excess calories without serious comorbidity with body mass index (BMI) of 38.0 to 38.9 in adult  Comments:  discussed use of Vyvanse & GLP1. Will start with Vyvanse but could consider adding GLP1 in future. Will check insurance coverage as well    Mild intermittent asthma without complication (HCC)  Comments:  consider pneumonia vaccine in fall    Other constipation    Family history of diabetes mellitus  -     Hemoglobin A1C; Future    Family history of cardiovascular disorder  Comments:  recommend screening CT calcium scan. Father had >99% blockage in his 40s  Orders:  -     CT CALCIUM SCORING; Future    Screening for cardiovascular condition  -     CT CALCIUM SCORING;  Future    Late menses  Comments:  negative pregnancy test today  Orders:  -     Urine Preg Test          Note to patient: The 21st Century Cures Act makes medical notes like these available to patients in the interest of transparency. However, be advised this is a medical document. It is intended as peer to peer communication. It is written in medical language and may contain abbreviations or verbiage that are unfamiliar. It may appear blunt or direct. Medical documents are intended to carry relevant information, facts as evident, and the clinical opinion of the practitioner.             [1]   Current Outpatient Medications   Medication Sig Dispense Refill    clonazePAM 0.5 MG Oral Tab Take 1 tablet (0.5 mg total) by mouth daily as needed for Anxiety. 30 tablet 0    desvenlafaxine  MG Oral Tablet 24 Hr Take 1 tablet (100 mg total) by mouth daily with breakfast. 90 tablet 0    busPIRone 10 MG Oral Tab Take 1 tablet (10 mg total) by mouth in the morning and 1 tablet (10 mg total) before bedtime. 180 tablet 0    desvenlafaxine ER (PRISTIQ) 50 MG Oral Tablet 24 Hr Take 1 tablet (50 mg total) by mouth daily with breakfast. 90 tablet 0    hydrOXYzine Pamoate (VISTARIL) 25 MG Oral Cap Take 1 capsule (25 mg total) by mouth 2 (two) times daily as needed for Anxiety. 120 capsule 0    albuterol 108 (90 Base) MCG/ACT Inhalation Aero Soln Inhale 2 puffs into the lungs every 6 (six) hours as needed for Wheezing or Shortness of Breath. 1 each 0    linaCLOtide (LINZESS) 145 MCG Oral Cap Take 145 mcg by mouth daily. 90 capsule 3   [2]   Past Medical History:   Abdominal distention    Abdominal pain    Abnormal Pap smear of cervix    during college, 2010? follow up nl    Allergic rhinitis    Asthma (HCC)    Inhaler last over a year ago  exercise  induced    Belching    Bloating    Body piercing    Constipation    Decorative tattoo    Easy bruising    Fatigue    Flatulence/gas pain/belching    Frequent urination    Frequent use  of laxatives    History of depression    Irregular bowel habits    Leaking of urine    Obesity    Pain in joints    Sleep disturbance    Stress    Wears glasses   [3]   Past Surgical History:  Procedure Laterality Date    Dental surgery procedure      wisdom teeth      4/15/16 & 19    Other  2017    rectal vaginal fistula repair    Other surgical history  2017    Rectovaginal fistula repair   [4]   Family History  Problem Relation Age of Onset    Alcohol and Other Disorders Associated Mother     Depression Mother     Mental Disorder Mother         Depression, Addiction (pain killers, alcohol)    Heart Attack Father     Diabetes Father     Lipids Father     Alcohol and Other Disorders Associated Father     Mental Disorder Father         Addiction (alcohol, pain killers, gambling)    Cancer Maternal Grandmother         lung    Diabetes Paternal Grandmother     Cancer Maternal Aunt         liver    Diabetes Maternal Aunt     Alcohol and Other Disorders Associated Maternal Aunt     Diabetes Maternal Uncle     Alcohol and Other Disorders Associated Maternal Uncle    [5]   Social History  Socioeconomic History    Marital status:    Tobacco Use    Smoking status: Never    Smokeless tobacco: Never   Vaping Use    Vaping status: Never Used   Substance and Sexual Activity    Alcohol use: Yes     Alcohol/week: 1.0 standard drink of alcohol     Types: 1 Standard drinks or equivalent per week     Comment: occasionally    Drug use: Never    Sexual activity: Yes     Partners: Male   Other Topics Concern    Caffeine Concern No    Exercise Yes    Seat Belt Yes    Special Diet No    Stress Concern Yes    Weight Concern No     Social Drivers of Health     Food Insecurity: No Food Insecurity (2025)    NCSS - Food Insecurity     Worried About Running Out of Food in the Last Year: No     Ran Out of Food in the Last Year: No   Transportation Needs: No Transportation Needs (2025)    NCSS - Transportation      Lack of Transportation: No   Housing Stability: Not At Risk (5/9/2025)    NCSS - Housing/Utilities     Has Housing: Yes     Worried About Losing Housing: No     Unable to Get Utilities: No

## 2025-05-10 ENCOUNTER — HOSPITAL ENCOUNTER (OUTPATIENT)
Dept: CT IMAGING | Age: 40
Discharge: HOME | End: 2025-05-10
Attending: NURSE PRACTITIONER

## 2025-05-10 DIAGNOSIS — Z13.6 SCREENING FOR CARDIOVASCULAR CONDITION: ICD-10-CM

## 2025-05-10 DIAGNOSIS — Z82.49 FAMILY HISTORY OF CARDIOVASCULAR DISORDER: ICD-10-CM

## 2025-05-13 ENCOUNTER — TELEPHONE (OUTPATIENT)
Dept: FAMILY MEDICINE CLINIC | Facility: CLINIC | Age: 40
End: 2025-05-13

## 2025-05-13 NOTE — TELEPHONE ENCOUNTER
----- Message from Lola Oh sent at 5/13/2025  8:10 AM CDT -----  CT calcium score is 0 which is excellent   ----- Message -----  From: Jennifer Quiñones In  Sent: 5/13/2025   7:49 AM CDT  To: OLIVER Sr

## (undated) DEVICE — SUTURE VICRYL 0 CT-1

## (undated) DEVICE — BULB SYRINGE,IRRIGATION WITH PROTECTIVE CAP: Brand: DOVER

## (undated) DEVICE — VIOLET BRAIDED (POLYGLACTIN 910), SYNTHETIC ABSORBABLE SUTURE: Brand: COATED VICRYL

## (undated) DEVICE — #15 STERILE STAINLESS BLADE: Brand: STERILE STAINLESS BLADES

## (undated) DEVICE — KENDALL SCD EXPRESS SLEEVES, KNEE LENGTH, MEDIUM: Brand: KENDALL SCD

## (undated) DEVICE — REM POLYHESIVE ADULT PATIENT RETURN ELECTRODE: Brand: VALLEYLAB

## (undated) DEVICE — SUTURE PDS II 3-0 CT-2

## (undated) DEVICE — COVER,MAYO STAND,STERILE: Brand: MEDLINE

## (undated) DEVICE — SOL  .9 1000ML BTL

## (undated) DEVICE — GLOVE SURG TRIUMPH SZ 6-1/2

## (undated) DEVICE — SOL  .9 1000ML BAG

## (undated) DEVICE — RETRACTOR LONE STAR STAYS LG

## (undated) DEVICE — GYN CDS: Brand: MEDLINE INDUSTRIES, INC.

## (undated) DEVICE — GLOVE SURG SENSICARE SZ 6-1/2

## (undated) DEVICE — SUTURE VICRYL 3-0 SH

## (undated) DEVICE — CAUTERY PENCIL

## (undated) DEVICE — TUBING CYSTO

## (undated) DEVICE — STANDARD HYPODERMIC NEEDLE,POLYPROPYLENE HUB: Brand: MONOJECT

## (undated) NOTE — ED AVS SNAPSHOT
[facility] in [facility] [address] [address]    Phone:  [phone]    Fax:  [address]   MRN: [id number]    Department:  [facility] in [place]   Date of Visit:  3/10/2017           Diag Si usted tiene algun problema con bonilla sequimiento, por favor llame a nuestro adminstrador de ced al [phone]. Expect to receive an electronic request (by e-mail or text) to complete a self-assessment the day after your visit.   You may also recei [facility] [address] (92 [facility]) [address] (900 [facility]) [address]  ([address]) [address] [facility] office, you can view your past visit information in NERITES by going to Visits < Visit Summaries. NERITES questions? Call [phone] for help. NERITES is NOT to be used for urgent needs. For medical emergencies, dial 911.

## (undated) NOTE — MR AVS SNAPSHOT
[address]  [address]  [address] [place] [address]  [phone]               Thank you for choosing us for your health care visit with Vinny Gomez DO.   We are glad to serve you and happy to provide you with t Call [phone] for help. [place] is NOT to be used for urgent needs. For medical emergencies, dial 911.            Visit [facility] online at  [email or web address]

## (undated) NOTE — MR AVS SNAPSHOT
[address]  [address]  [address] [place] [address]  [phone]               Thank you for choosing us for your health care visit with Wale Coates DO.   We are glad to serve you and happy to provide you with t Office telephone, [phone], after hours [address]    Constipation can have detrimental effects on bladder function and can worsen the symptoms of prolapse.   It is important to avoid constip Commonly known as:  VENTOLIN           PRENATAL VITAMIN OR   Take  by mouth.                    MyChart     Visit [employer]  You can access your [employer] to more actively manage your health care and view more details from this visit by going to [email or web address] 2 ½ hours per week – spread out over time Use a jono to keep you motivated   Don’t forget strength training with weights and resistance Set goals and track your progress   You don’t need to join a gym. Home exercises work great.  Put more priority on exe

## (undated) NOTE — ED AVS SNAPSHOT
[facility] in [address] [address]    Phone:  [phone]    Fax:  [address]   MRN: [id number]    Department:  [facility]   Date of Visit:  1/21/2017           Diag - Albuterol Sulfate  (90 BASE) MCG/ACT Aers  - Cefuroxime Axetil 500 MG Tabs  - predniSONE 20 MG Tabs            Discharge References/Attachments     ASTHMA, ACUTE (ADULT) (ENGLISH)    ACUTE BRONCHITIS, WHAT IS (ENGLISH)      Disclosure     [facility]. Follow-up care is at the discretion of that Physician. IF THERE IS ANY CHANGE OR WORSENING OF YOUR CONDITION, CALL YOUR PRIMARY CARE PHYSICIAN AT ONCE OR GO TO THE EMERGENCY DEPARTMENT.     If you have been prescribed any medication(s), - If you don’t have insurance, Leesa Hameed has partnered with [facility] to help you get signed up for insurance coverage.   [facility] is a Federal Navigator program that can help with your Affordable Care Act cover

## (undated) NOTE — Clinical Note
Jazzmine - clear stool per vagina with digital rectal exam, RV Fistula. Offered her exp mgmt with bowel mgmt vs. Surgery. Discussed risks of recurrence, fecal incontinence, & dyspareunia with surgery. Gave her IUGA info.  She's considering her options and zina

## (undated) NOTE — IP AVS SNAPSHOT
[facility] [place] [address] [place], [address] ~ [phone]                Discharge Summary   6/1/2017    Chirag Fenton           Admission Information        Provider Department    6/1/2017 DO Justin Suazo / Naresh Hanna 3.  Drink [employer]  · Sometimes after surgery; you don't feel like eating normally  · If you don't drink water; you could get dehydrated    Alcoholic beverages should be avoided for:  · 24 hours after Anesthesia/Sedation    Call the doctor for:  · El and ask to get set up for an insurance coverage that is in-network with Leesa Hameed.         Visit Information        Department Dept Phone    6/1/2017 12:22 PM  Pre-Op / [facility] [phone]         We want to hear from you       We want to hear